# Patient Record
Sex: MALE | Race: ASIAN | NOT HISPANIC OR LATINO | URBAN - METROPOLITAN AREA
[De-identification: names, ages, dates, MRNs, and addresses within clinical notes are randomized per-mention and may not be internally consistent; named-entity substitution may affect disease eponyms.]

---

## 2023-01-14 ENCOUNTER — EMERGENCY (EMERGENCY)
Facility: HOSPITAL | Age: 48
LOS: 1 days | Discharge: HOME | End: 2023-01-14
Attending: EMERGENCY MEDICINE
Payer: COMMERCIAL

## 2023-01-14 VITALS
DIASTOLIC BLOOD PRESSURE: 99 MMHG | WEIGHT: 149.91 LBS | RESPIRATION RATE: 18 BRPM | HEART RATE: 92 BPM | HEIGHT: 67 IN | SYSTOLIC BLOOD PRESSURE: 166 MMHG | TEMPERATURE: 98 F | OXYGEN SATURATION: 98 %

## 2023-01-14 VITALS
RESPIRATION RATE: 18 BRPM | TEMPERATURE: 98 F | OXYGEN SATURATION: 100 % | HEART RATE: 74 BPM | SYSTOLIC BLOOD PRESSURE: 152 MMHG | DIASTOLIC BLOOD PRESSURE: 90 MMHG

## 2023-01-14 LAB
ALBUMIN SERPL ELPH-MCNC: 4.9 G/DL — SIGNIFICANT CHANGE UP (ref 3.3–5)
ALP SERPL-CCNC: 67 U/L — SIGNIFICANT CHANGE UP (ref 40–120)
ALT FLD-CCNC: 45 U/L — SIGNIFICANT CHANGE UP (ref 10–45)
ANION GAP SERPL CALC-SCNC: 11 MMOL/L — SIGNIFICANT CHANGE UP (ref 5–17)
APPEARANCE UR: CLEAR — SIGNIFICANT CHANGE UP
AST SERPL-CCNC: 20 U/L — SIGNIFICANT CHANGE UP (ref 10–40)
BASE EXCESS BLDV CALC-SCNC: 2.8 MMOL/L — SIGNIFICANT CHANGE UP (ref -2–3)
BASOPHILS # BLD AUTO: 0.05 K/UL — SIGNIFICANT CHANGE UP (ref 0–0.2)
BASOPHILS NFR BLD AUTO: 0.8 % — SIGNIFICANT CHANGE UP (ref 0–2)
BILIRUB SERPL-MCNC: 0.2 MG/DL — SIGNIFICANT CHANGE UP (ref 0.2–1.2)
BILIRUB UR-MCNC: NEGATIVE — SIGNIFICANT CHANGE UP
BUN SERPL-MCNC: 15 MG/DL — SIGNIFICANT CHANGE UP (ref 7–23)
CA-I SERPL-SCNC: 1.23 MMOL/L — SIGNIFICANT CHANGE UP (ref 1.15–1.33)
CALCIUM SERPL-MCNC: 9.7 MG/DL — SIGNIFICANT CHANGE UP (ref 8.4–10.5)
CHLORIDE BLDV-SCNC: 106 MMOL/L — SIGNIFICANT CHANGE UP (ref 96–108)
CHLORIDE SERPL-SCNC: 106 MMOL/L — SIGNIFICANT CHANGE UP (ref 96–108)
CO2 BLDV-SCNC: 32 MMOL/L — HIGH (ref 22–26)
CO2 SERPL-SCNC: 26 MMOL/L — SIGNIFICANT CHANGE UP (ref 22–31)
COLOR SPEC: SIGNIFICANT CHANGE UP
CREAT SERPL-MCNC: 0.96 MG/DL — SIGNIFICANT CHANGE UP (ref 0.5–1.3)
DIFF PNL FLD: NEGATIVE — SIGNIFICANT CHANGE UP
EGFR: 99 ML/MIN/1.73M2 — SIGNIFICANT CHANGE UP
EOSINOPHIL # BLD AUTO: 0.24 K/UL — SIGNIFICANT CHANGE UP (ref 0–0.5)
EOSINOPHIL NFR BLD AUTO: 3.9 % — SIGNIFICANT CHANGE UP (ref 0–6)
FLUAV AG NPH QL: SIGNIFICANT CHANGE UP
FLUBV AG NPH QL: SIGNIFICANT CHANGE UP
GAS PNL BLDV: 141 MMOL/L — SIGNIFICANT CHANGE UP (ref 136–145)
GAS PNL BLDV: SIGNIFICANT CHANGE UP
GLUCOSE BLDV-MCNC: 94 MG/DL — SIGNIFICANT CHANGE UP (ref 70–99)
GLUCOSE SERPL-MCNC: 94 MG/DL — SIGNIFICANT CHANGE UP (ref 70–99)
GLUCOSE UR QL: NEGATIVE — SIGNIFICANT CHANGE UP
HCO3 BLDV-SCNC: 30 MMOL/L — HIGH (ref 22–29)
HCT VFR BLD CALC: 47.7 % — SIGNIFICANT CHANGE UP (ref 39–50)
HCT VFR BLDA CALC: 46 % — SIGNIFICANT CHANGE UP (ref 39–51)
HGB BLD CALC-MCNC: 15.4 G/DL — SIGNIFICANT CHANGE UP (ref 12.6–17.4)
HGB BLD-MCNC: 15.4 G/DL — SIGNIFICANT CHANGE UP (ref 13–17)
IMM GRANULOCYTES NFR BLD AUTO: 0.2 % — SIGNIFICANT CHANGE UP (ref 0–0.9)
KETONES UR-MCNC: NEGATIVE — SIGNIFICANT CHANGE UP
LACTATE BLDV-MCNC: 1.4 MMOL/L — SIGNIFICANT CHANGE UP (ref 0.5–2)
LEUKOCYTE ESTERASE UR-ACNC: NEGATIVE — SIGNIFICANT CHANGE UP
LIDOCAIN IGE QN: 51 U/L — SIGNIFICANT CHANGE UP (ref 7–60)
LYMPHOCYTES # BLD AUTO: 2.14 K/UL — SIGNIFICANT CHANGE UP (ref 1–3.3)
LYMPHOCYTES # BLD AUTO: 35 % — SIGNIFICANT CHANGE UP (ref 13–44)
MCHC RBC-ENTMCNC: 25.7 PG — LOW (ref 27–34)
MCHC RBC-ENTMCNC: 32.3 GM/DL — SIGNIFICANT CHANGE UP (ref 32–36)
MCV RBC AUTO: 79.6 FL — LOW (ref 80–100)
MONOCYTES # BLD AUTO: 0.56 K/UL — SIGNIFICANT CHANGE UP (ref 0–0.9)
MONOCYTES NFR BLD AUTO: 9.2 % — SIGNIFICANT CHANGE UP (ref 2–14)
NEUTROPHILS # BLD AUTO: 3.11 K/UL — SIGNIFICANT CHANGE UP (ref 1.8–7.4)
NEUTROPHILS NFR BLD AUTO: 50.9 % — SIGNIFICANT CHANGE UP (ref 43–77)
NITRITE UR-MCNC: NEGATIVE — SIGNIFICANT CHANGE UP
NRBC # BLD: 0 /100 WBCS — SIGNIFICANT CHANGE UP (ref 0–0)
PCO2 BLDV: 56 MMHG — HIGH (ref 42–55)
PH BLDV: 7.34 — SIGNIFICANT CHANGE UP (ref 7.32–7.43)
PH UR: 7 — SIGNIFICANT CHANGE UP (ref 5–8)
PLATELET # BLD AUTO: 240 K/UL — SIGNIFICANT CHANGE UP (ref 150–400)
PO2 BLDV: 22 MMHG — LOW (ref 25–45)
POTASSIUM BLDV-SCNC: 4.4 MMOL/L — SIGNIFICANT CHANGE UP (ref 3.5–5.1)
POTASSIUM SERPL-MCNC: 4.4 MMOL/L — SIGNIFICANT CHANGE UP (ref 3.5–5.3)
POTASSIUM SERPL-SCNC: 4.4 MMOL/L — SIGNIFICANT CHANGE UP (ref 3.5–5.3)
PROT SERPL-MCNC: 7.8 G/DL — SIGNIFICANT CHANGE UP (ref 6–8.3)
PROT UR-MCNC: SIGNIFICANT CHANGE UP
RBC # BLD: 5.99 M/UL — HIGH (ref 4.2–5.8)
RBC # FLD: 12.3 % — SIGNIFICANT CHANGE UP (ref 10.3–14.5)
RSV RNA NPH QL NAA+NON-PROBE: SIGNIFICANT CHANGE UP
SAO2 % BLDV: 23.9 % — LOW (ref 67–88)
SARS-COV-2 RNA SPEC QL NAA+PROBE: SIGNIFICANT CHANGE UP
SODIUM SERPL-SCNC: 143 MMOL/L — SIGNIFICANT CHANGE UP (ref 135–145)
SP GR SPEC: >1.05 (ref 1.01–1.02)
TROPONIN T, HIGH SENSITIVITY RESULT: <6 NG/L — SIGNIFICANT CHANGE UP (ref 0–51)
UROBILINOGEN FLD QL: NEGATIVE — SIGNIFICANT CHANGE UP
WBC # BLD: 6.11 K/UL — SIGNIFICANT CHANGE UP (ref 3.8–10.5)
WBC # FLD AUTO: 6.11 K/UL — SIGNIFICANT CHANGE UP (ref 3.8–10.5)

## 2023-01-14 PROCEDURE — 84484 ASSAY OF TROPONIN QUANT: CPT

## 2023-01-14 PROCEDURE — 85018 HEMOGLOBIN: CPT

## 2023-01-14 PROCEDURE — 82330 ASSAY OF CALCIUM: CPT

## 2023-01-14 PROCEDURE — 81003 URINALYSIS AUTO W/O SCOPE: CPT

## 2023-01-14 PROCEDURE — 74177 CT ABD & PELVIS W/CONTRAST: CPT | Mod: MA

## 2023-01-14 PROCEDURE — 99284 EMERGENCY DEPT VISIT MOD MDM: CPT

## 2023-01-14 PROCEDURE — 84295 ASSAY OF SERUM SODIUM: CPT

## 2023-01-14 PROCEDURE — 82947 ASSAY GLUCOSE BLOOD QUANT: CPT

## 2023-01-14 PROCEDURE — 85025 COMPLETE CBC W/AUTO DIFF WBC: CPT

## 2023-01-14 PROCEDURE — 85014 HEMATOCRIT: CPT

## 2023-01-14 PROCEDURE — 83690 ASSAY OF LIPASE: CPT

## 2023-01-14 PROCEDURE — 82435 ASSAY OF BLOOD CHLORIDE: CPT

## 2023-01-14 PROCEDURE — 83605 ASSAY OF LACTIC ACID: CPT

## 2023-01-14 PROCEDURE — 80053 COMPREHEN METABOLIC PANEL: CPT

## 2023-01-14 PROCEDURE — 99285 EMERGENCY DEPT VISIT HI MDM: CPT | Mod: 25

## 2023-01-14 PROCEDURE — 74177 CT ABD & PELVIS W/CONTRAST: CPT | Mod: 26,MA

## 2023-01-14 PROCEDURE — 71046 X-RAY EXAM CHEST 2 VIEWS: CPT

## 2023-01-14 PROCEDURE — 84132 ASSAY OF SERUM POTASSIUM: CPT

## 2023-01-14 PROCEDURE — 36415 COLL VENOUS BLD VENIPUNCTURE: CPT

## 2023-01-14 PROCEDURE — 87086 URINE CULTURE/COLONY COUNT: CPT

## 2023-01-14 PROCEDURE — 93005 ELECTROCARDIOGRAM TRACING: CPT

## 2023-01-14 PROCEDURE — 87637 SARSCOV2&INF A&B&RSV AMP PRB: CPT

## 2023-01-14 PROCEDURE — 71046 X-RAY EXAM CHEST 2 VIEWS: CPT | Mod: 26

## 2023-01-14 PROCEDURE — 82803 BLOOD GASES ANY COMBINATION: CPT

## 2023-01-14 NOTE — ED PROVIDER NOTE - NSFOLLOWUPCLINICS_GEN_ALL_ED_FT
Gastroenterology at [facility]  Gastroenterology  [address] [place] [id number]  Phone: [phone]  Fax:

## 2023-01-14 NOTE — ED PROVIDER NOTE - OBJECTIVE STATEMENT
47M no PMH p/w abdominal pain and distention. Wife at bedside. Pt says the pain is a stabbing pain located throughout his abdomen, is intermittent, started 4 months ago, does not radiate. Today the pain lasted for 1 hour and was more severe. Says that sometimes the pain occurs after eating. Nothing relieves the pain. Pt says that when he has the pain, his abdomen gets distended. Denies fever, N/V, chest pain, SOB, diarrhea, constipation, urinary symptoms. Is passing gas. No hx of abdominal surgeries.

## 2023-01-14 NOTE — ED PROVIDER NOTE - PROGRESS NOTE DETAILS
Maame Pal M.D. (Resident Physician) Maame Pal M.D. (Resident Physician): Able to tolerate PO. Will dc with GI f/u.

## 2023-01-14 NOTE — ED PROVIDER NOTE - ATTENDING CONTRIBUTION TO CARE
Attending MD Damaris Chan:  I personally have seen and examined this patient.  Resident note reviewed and agree on plan of care and except where noted.  See HPI, PE, and MDM for details.

## 2023-01-14 NOTE — ED PROVIDER NOTE - PHYSICAL EXAMINATION
PHYSICAL EXAM:  GENERAL: Sitting comfortable in bed, in no acute distress  HENMT: Atraumatic, moist mucous membranes, no oropharyngeal exudates or vesicles, uvula is midline EYES: Clear bilaterally, PERRL, EOMs intact b/l  HEART: RRR, S1/S2, no murmur  RESPIRATORY: Clear to auscultation bilaterally, no wheezes/rhonchi/rales  ABDOMEN: +BS, soft, mild epigastric ttp, nondistended  EXTREMITIES: No lower extremity edema, +2 radial and pt pulses b/l  NEURO: A&Ox3  SKIN: Skin normal color for race, warm, dry and intact PHYSICAL EXAM:  GENERAL: Sitting comfortable in bed, in no acute distress  HENMT: Atraumatic, moist mucous membranes, no oropharyngeal exudates or vesicles, uvula is midline EYES: Clear bilaterally, PERRL, EOMs intact b/l  HEART: RRR, S1/S2, no murmur  RESPIRATORY: Clear to auscultation bilaterally, no wheezes/rhonchi/rales  ABDOMEN: +BS, soft, mild epigastric ttp, nondistended  EXTREMITIES: No lower extremity edema, +2 radial and pt pulses b/l  NEURO: A&Ox3  SKIN: Skin normal color for race, warm, dry and intact  Attending Damaris Chan: Gen: NAD, heent: atrauamtic, eomi, perrla,sclera anicteric mmm, op pink, , neck; nttp, no nuchal rigidity, chest: nttp, no crepitus, cv: rrr, no murmurs, lungs: ctab, abd: soft, nontender, nondistended, no peritoneal signs, no guarding, ext: wwp, neg homans, skin: no rash, neuro: awake and alert, following commands, speech clear, sensation and strength intact, no focal deficits

## 2023-01-14 NOTE — ED PROVIDER NOTE - RAPID ASSESSMENT
Dr. Toro ED Attending- abd pain and distention for 4 months, no evaluation for this prior, denies smoking, social etoh, denies cp, sob, n/v/d/c, dysuria, recent travel, dark stools, brbpr, abdominal surgeries, weight loss, night sweats  reports eating makes the pain worse  denies pmhx, pshx, NKDA    Patient was rapidly assessed via telemedicine encounter; a limited history and physical exam was performed. The patient will be seen and further worked up in the main emergency department and their care will be completed by the main emergency department team. Receiving team will follow up on labs, analgesia, any clinical imaging, and perform reassessment and disposition of the patient as clinically indicated.  All decisions regarding the progression of care will be made at their discretion.

## 2023-01-14 NOTE — ED PROVIDER NOTE - CLINICAL SUMMARY MEDICAL DECISION MAKING FREE TEXT BOX
47M no PMH p/w abdominal pain and distention. Vitals: /99, others unremarkable. On exam, abdomen with +BS, soft, mild epigastric ttp, nondistended. Possible acid reflux. Will r/o SBO. Plan: blood work, UA/UC, Ct abd/pel. Will re-assess. 47M no PMH p/w abdominal pain and distention. Vitals: /99, others unremarkable. On exam, abdomen with +BS, soft, mild epigastric ttp, nondistended. Possible acid reflux. Will r/o SBO. Plan: blood work, UA/UC, Ct abd/pel. Will re-assess.  Attending Damaris Chan: 46 yo male presenting with abdominal pain. upon arrival pt hemodynamically stable. on exam abd soft. no RUQ ttp to suggest acute cholecysittis. pt passing gas and having BM making obstruction less likely. no RLQ ttp to suggest acute appendictis. ct scan ordered to further evaluate without evidence of acute surgical patholgy. no dysuria or testicle pain to suggest torsion or uti. pt well appearing. tolerating po in the ed. will give GI follow up no chest pain or sob to suggest ACS

## 2023-01-14 NOTE — ED ADULT NURSE NOTE - OBJECTIVE STATEMENT
48yo M denies pmh presents to ED from home with wife at bedside with c/o abdominal pain and distention x4 months. pt reports the pain comes and goes intermittently and is a stabbing pain felt diffusely throughout the abdomen, does not radiate. reports abdomen is distended associated with the pain and has been progressively worsening. has not been seen by Dr. yeung for symptoms but presents to ED today because he had approx 1 hour episode of very severe pain which has since resolved on its own. Denies any pain currently on exam in ED. pt reports he is able to tolerate po intake, bowel movements have been normal, and has been passing gas. pt denies any associated fevers, nausea, vomiting, diarrhea, constipation, urinary changes, or hx of abdominal surgeries. on exam pt is awake and alert, oriented x4, spontaneous unlabored respirations, afebrile, VSS, abd soft nt nd. pt seen and eval by ED MD. IV placed, labs drawn and sent. CT scan completed. pending results. plan of care discussed with pt and wife at bedside.

## 2023-01-14 NOTE — ED PROVIDER NOTE - NSFOLLOWUPINSTRUCTIONS_ED_ALL_ED_FT
You have been evaluated in the Emergency Department today for abdominal pain. Your evaluation did not show evidence of medical conditions requiring emergent intervention at this time.    Please call [phone] to schedule an appointment with the gastroenterologist.     Avoid eating spicy foods and taking any NSAIDs.    Return to the Emergency Department if you experience worsening or uncontrolled pain, fevers 100.4°F or greater, recurrent vomiting, inability to tolerate food or fluids by mouth, bloody stools or vomit, black or tarry stools, or any other concerning symptoms.    Thank you for choosing us for your care.

## 2023-01-14 NOTE — ED PROVIDER NOTE - PATIENT PORTAL LINK FT
You can access the FollowMyHealth Patient Portal offered by [facility] by registering at the following website: [email or web address]. By joining [facility]’s FollowMyHealth portal, you will also be able to view your health information using other applications (apps) compatible with our system.

## 2023-01-16 LAB
CULTURE RESULTS: NO GROWTH — SIGNIFICANT CHANGE UP
SPECIMEN SOURCE: SIGNIFICANT CHANGE UP

## 2024-10-26 ENCOUNTER — INPATIENT (INPATIENT)
Facility: HOSPITAL | Age: 49
LOS: 5 days | Discharge: HOME HEALTH | DRG: 552 | End: 2024-11-01
Attending: INTERNAL MEDICINE | Admitting: INTERNAL MEDICINE
Payer: COMMERCIAL

## 2024-10-26 VITALS
SYSTOLIC BLOOD PRESSURE: 166 MMHG | DIASTOLIC BLOOD PRESSURE: 98 MMHG | OXYGEN SATURATION: 100 % | HEIGHT: 67 IN | HEART RATE: 92 BPM | RESPIRATION RATE: 18 BRPM | WEIGHT: 147.93 LBS | TEMPERATURE: 99 F

## 2024-10-26 DIAGNOSIS — M54.42 LUMBAGO WITH SCIATICA, LEFT SIDE: ICD-10-CM

## 2024-10-26 PROBLEM — Z78.9 OTHER SPECIFIED HEALTH STATUS: Chronic | Status: ACTIVE | Noted: 2023-01-14

## 2024-10-26 LAB
ALBUMIN SERPL ELPH-MCNC: 4.6 G/DL — SIGNIFICANT CHANGE UP (ref 3.3–5)
ALP SERPL-CCNC: 65 U/L — SIGNIFICANT CHANGE UP (ref 40–120)
ALT FLD-CCNC: 40 U/L — SIGNIFICANT CHANGE UP (ref 10–45)
ANION GAP SERPL CALC-SCNC: 13 MMOL/L — SIGNIFICANT CHANGE UP (ref 5–17)
APPEARANCE UR: CLEAR — SIGNIFICANT CHANGE UP
AST SERPL-CCNC: 21 U/L — SIGNIFICANT CHANGE UP (ref 10–40)
BACTERIA # UR AUTO: NEGATIVE /HPF — SIGNIFICANT CHANGE UP
BASOPHILS # BLD AUTO: 0.05 K/UL — SIGNIFICANT CHANGE UP (ref 0–0.2)
BASOPHILS NFR BLD AUTO: 0.6 % — SIGNIFICANT CHANGE UP (ref 0–2)
BILIRUB SERPL-MCNC: 0.4 MG/DL — SIGNIFICANT CHANGE UP (ref 0.2–1.2)
BILIRUB UR-MCNC: NEGATIVE — SIGNIFICANT CHANGE UP
BUN SERPL-MCNC: 14 MG/DL — SIGNIFICANT CHANGE UP (ref 7–23)
CALCIUM SERPL-MCNC: 9.5 MG/DL — SIGNIFICANT CHANGE UP (ref 8.4–10.5)
CAST: 0 /LPF — SIGNIFICANT CHANGE UP (ref 0–4)
CHLORIDE SERPL-SCNC: 103 MMOL/L — SIGNIFICANT CHANGE UP (ref 96–108)
CO2 SERPL-SCNC: 22 MMOL/L — SIGNIFICANT CHANGE UP (ref 22–31)
COLOR SPEC: YELLOW — SIGNIFICANT CHANGE UP
CREAT SERPL-MCNC: 1.13 MG/DL — SIGNIFICANT CHANGE UP (ref 0.5–1.3)
DIFF PNL FLD: NEGATIVE — SIGNIFICANT CHANGE UP
EGFR: 81 ML/MIN/1.73M2 — SIGNIFICANT CHANGE UP
EOSINOPHIL # BLD AUTO: 0.2 K/UL — SIGNIFICANT CHANGE UP (ref 0–0.5)
EOSINOPHIL NFR BLD AUTO: 2.3 % — SIGNIFICANT CHANGE UP (ref 0–6)
GLUCOSE SERPL-MCNC: 96 MG/DL — SIGNIFICANT CHANGE UP (ref 70–99)
GLUCOSE UR QL: NEGATIVE MG/DL — SIGNIFICANT CHANGE UP
HCT VFR BLD CALC: 42.7 % — SIGNIFICANT CHANGE UP (ref 39–50)
HGB BLD-MCNC: 13.6 G/DL — SIGNIFICANT CHANGE UP (ref 13–17)
IMM GRANULOCYTES NFR BLD AUTO: 0.5 % — SIGNIFICANT CHANGE UP (ref 0–0.9)
KETONES UR-MCNC: NEGATIVE MG/DL — SIGNIFICANT CHANGE UP
LEUKOCYTE ESTERASE UR-ACNC: NEGATIVE — SIGNIFICANT CHANGE UP
LYMPHOCYTES # BLD AUTO: 1.52 K/UL — SIGNIFICANT CHANGE UP (ref 1–3.3)
LYMPHOCYTES # BLD AUTO: 17.6 % — SIGNIFICANT CHANGE UP (ref 13–44)
MCHC RBC-ENTMCNC: 25.3 PG — LOW (ref 27–34)
MCHC RBC-ENTMCNC: 31.9 GM/DL — LOW (ref 32–36)
MCV RBC AUTO: 79.5 FL — LOW (ref 80–100)
MONOCYTES # BLD AUTO: 0.79 K/UL — SIGNIFICANT CHANGE UP (ref 0–0.9)
MONOCYTES NFR BLD AUTO: 9.2 % — SIGNIFICANT CHANGE UP (ref 2–14)
NEUTROPHILS # BLD AUTO: 6.03 K/UL — SIGNIFICANT CHANGE UP (ref 1.8–7.4)
NEUTROPHILS NFR BLD AUTO: 69.8 % — SIGNIFICANT CHANGE UP (ref 43–77)
NITRITE UR-MCNC: NEGATIVE — SIGNIFICANT CHANGE UP
NRBC # BLD: 0 /100 WBCS — SIGNIFICANT CHANGE UP (ref 0–0)
PH UR: 7 — SIGNIFICANT CHANGE UP (ref 5–8)
PLATELET # BLD AUTO: 244 K/UL — SIGNIFICANT CHANGE UP (ref 150–400)
POTASSIUM SERPL-MCNC: 4.3 MMOL/L — SIGNIFICANT CHANGE UP (ref 3.5–5.3)
POTASSIUM SERPL-SCNC: 4.3 MMOL/L — SIGNIFICANT CHANGE UP (ref 3.5–5.3)
PROT SERPL-MCNC: 7.4 G/DL — SIGNIFICANT CHANGE UP (ref 6–8.3)
PROT UR-MCNC: NEGATIVE MG/DL — SIGNIFICANT CHANGE UP
RBC # BLD: 5.37 M/UL — SIGNIFICANT CHANGE UP (ref 4.2–5.8)
RBC # FLD: 12.6 % — SIGNIFICANT CHANGE UP (ref 10.3–14.5)
RBC CASTS # UR COMP ASSIST: 0 /HPF — SIGNIFICANT CHANGE UP (ref 0–4)
SODIUM SERPL-SCNC: 138 MMOL/L — SIGNIFICANT CHANGE UP (ref 135–145)
SP GR SPEC: 1.01 — SIGNIFICANT CHANGE UP (ref 1–1.03)
SQUAMOUS # UR AUTO: 0 /HPF — SIGNIFICANT CHANGE UP (ref 0–5)
UROBILINOGEN FLD QL: 0.2 MG/DL — SIGNIFICANT CHANGE UP (ref 0.2–1)
WBC # BLD: 8.63 K/UL — SIGNIFICANT CHANGE UP (ref 3.8–10.5)
WBC # FLD AUTO: 8.63 K/UL — SIGNIFICANT CHANGE UP (ref 3.8–10.5)
WBC UR QL: 0 /HPF — SIGNIFICANT CHANGE UP (ref 0–5)

## 2024-10-26 PROCEDURE — 99285 EMERGENCY DEPT VISIT HI MDM: CPT

## 2024-10-26 PROCEDURE — 72131 CT LUMBAR SPINE W/O DYE: CPT | Mod: 26

## 2024-10-26 PROCEDURE — 72100 X-RAY EXAM L-S SPINE 2/3 VWS: CPT | Mod: 26

## 2024-10-26 RX ORDER — HEPARIN SODIUM 10000 [USP'U]/ML
5000 INJECTION INTRAVENOUS; SUBCUTANEOUS EVERY 8 HOURS
Refills: 0 | Status: DISCONTINUED | OUTPATIENT
Start: 2024-10-26 | End: 2024-11-01

## 2024-10-26 RX ORDER — SODIUM CHLORIDE 9 MG/ML
1000 INJECTION, SOLUTION INTRAMUSCULAR; INTRAVENOUS; SUBCUTANEOUS ONCE
Refills: 0 | Status: COMPLETED | OUTPATIENT
Start: 2024-10-26 | End: 2024-10-26

## 2024-10-26 RX ORDER — LIDOCAINE HYDROCHLORIDE 40 MG/ML
1 SOLUTION TOPICAL
Refills: 0 | DISCHARGE

## 2024-10-26 RX ORDER — CYANOCOBALAMIN (VITAMIN B-12) 1000MCG/15
1000 LIQUID (ML) ORAL DAILY
Refills: 0 | Status: DISCONTINUED | OUTPATIENT
Start: 2024-10-26 | End: 2024-11-01

## 2024-10-26 RX ORDER — LISINOPRIL 40 MG
5 TABLET ORAL ONCE
Refills: 0 | Status: COMPLETED | OUTPATIENT
Start: 2024-10-26 | End: 2024-10-26

## 2024-10-26 RX ORDER — HYDROMORPHONE HCL/0.9% NACL/PF 6 MG/30 ML
1 PATIENT CONTROLLED ANALGESIA SYRINGE INTRAVENOUS ONCE
Refills: 0 | Status: DISCONTINUED | OUTPATIENT
Start: 2024-10-26 | End: 2024-10-26

## 2024-10-26 RX ORDER — LISINOPRIL 40 MG
5 TABLET ORAL DAILY
Refills: 0 | Status: DISCONTINUED | OUTPATIENT
Start: 2024-10-26 | End: 2024-10-28

## 2024-10-26 RX ORDER — ACETAMINOPHEN 500 MG
1000 TABLET ORAL ONCE
Refills: 0 | Status: COMPLETED | OUTPATIENT
Start: 2024-10-26 | End: 2024-10-26

## 2024-10-26 RX ORDER — MORPHINE SULFATE 30 MG/1
4 TABLET, EXTENDED RELEASE ORAL ONCE
Refills: 0 | Status: DISCONTINUED | OUTPATIENT
Start: 2024-10-26 | End: 2024-10-26

## 2024-10-26 RX ORDER — MORPHINE SULFATE 30 MG/1
2 TABLET, EXTENDED RELEASE ORAL EVERY 6 HOURS
Refills: 0 | Status: DISCONTINUED | OUTPATIENT
Start: 2024-10-26 | End: 2024-10-28

## 2024-10-26 RX ORDER — NAPROXEN 250 MG/1
500 TABLET ORAL
Refills: 0 | Status: DISCONTINUED | OUTPATIENT
Start: 2024-10-26 | End: 2024-10-26

## 2024-10-26 RX ORDER — CHOLECALCIFEROL (VITAMIN D3) 625 MCG
1 CAPSULE ORAL
Refills: 0 | DISCHARGE

## 2024-10-26 RX ORDER — OXYCODONE HYDROCHLORIDE 30 MG/1
5 TABLET ORAL
Refills: 0 | Status: DISCONTINUED | OUTPATIENT
Start: 2024-10-26 | End: 2024-10-26

## 2024-10-26 RX ORDER — DIAZEPAM 10 MG/1
5 FILM BUCCAL ONCE
Refills: 0 | Status: DISCONTINUED | OUTPATIENT
Start: 2024-10-26 | End: 2024-10-26

## 2024-10-26 RX ORDER — LIDOCAINE HYDROCHLORIDE 40 MG/ML
1 SOLUTION TOPICAL DAILY
Refills: 0 | Status: DISCONTINUED | OUTPATIENT
Start: 2024-10-26 | End: 2024-10-26

## 2024-10-26 RX ORDER — PREGABALIN 150 MG/1
75 CAPSULE ORAL
Refills: 0 | Status: DISCONTINUED | OUTPATIENT
Start: 2024-10-26 | End: 2024-10-27

## 2024-10-26 RX ORDER — CYANOCOBALAMIN (VITAMIN B-12) 1000MCG/15
1 LIQUID (ML) ORAL
Refills: 0 | DISCHARGE

## 2024-10-26 RX ORDER — LIDOCAINE HYDROCHLORIDE 40 MG/ML
1 SOLUTION TOPICAL EVERY 24 HOURS
Refills: 0 | Status: DISCONTINUED | OUTPATIENT
Start: 2024-10-26 | End: 2024-11-01

## 2024-10-26 RX ORDER — METHOCARBAMOL 500 MG/1
750 TABLET ORAL
Refills: 0 | Status: DISCONTINUED | OUTPATIENT
Start: 2024-10-26 | End: 2024-10-29

## 2024-10-26 RX ORDER — KETOROLAC TROMETHAMINE 30 MG/ML
15 INJECTION INTRAMUSCULAR; INTRAVENOUS EVERY 8 HOURS
Refills: 0 | Status: DISCONTINUED | OUTPATIENT
Start: 2024-10-26 | End: 2024-10-27

## 2024-10-26 RX ADMIN — HEPARIN SODIUM 5000 UNIT(S): 10000 INJECTION INTRAVENOUS; SUBCUTANEOUS at 21:15

## 2024-10-26 RX ADMIN — KETOROLAC TROMETHAMINE 15 MILLIGRAM(S): 30 INJECTION INTRAMUSCULAR; INTRAVENOUS at 23:14

## 2024-10-26 RX ADMIN — Medication 1 MILLIGRAM(S): at 19:58

## 2024-10-26 RX ADMIN — NAPROXEN 500 MILLIGRAM(S): 250 TABLET ORAL at 21:15

## 2024-10-26 RX ADMIN — MORPHINE SULFATE 4 MILLIGRAM(S): 30 TABLET, EXTENDED RELEASE ORAL at 14:10

## 2024-10-26 RX ADMIN — MORPHINE SULFATE 4 MILLIGRAM(S): 30 TABLET, EXTENDED RELEASE ORAL at 13:29

## 2024-10-26 RX ADMIN — DIAZEPAM 5 MILLIGRAM(S): 10 FILM BUCCAL at 15:45

## 2024-10-26 RX ADMIN — SODIUM CHLORIDE 1000 MILLILITER(S): 9 INJECTION, SOLUTION INTRAMUSCULAR; INTRAVENOUS; SUBCUTANEOUS at 13:29

## 2024-10-26 RX ADMIN — Medication 1000 MILLIGRAM(S): at 14:10

## 2024-10-26 RX ADMIN — METHOCARBAMOL 750 MILLIGRAM(S): 500 TABLET ORAL at 21:15

## 2024-10-26 RX ADMIN — LIDOCAINE HYDROCHLORIDE 1 PATCH: 40 SOLUTION TOPICAL at 21:44

## 2024-10-26 RX ADMIN — Medication 400 MILLIGRAM(S): at 13:29

## 2024-10-26 RX ADMIN — PREGABALIN 75 MILLIGRAM(S): 150 CAPSULE ORAL at 21:15

## 2024-10-26 RX ADMIN — NAPROXEN 500 MILLIGRAM(S): 250 TABLET ORAL at 21:45

## 2024-10-26 RX ADMIN — Medication 5 MILLIGRAM(S): at 21:14

## 2024-10-26 NOTE — ED PROVIDER NOTE - CARE PLAN
1 Principal Discharge DX:	Low back pain with left-sided sciatica   Principal Discharge DX:	Low back pain with left-sided sciatica  Secondary Diagnosis:	Difficulty walking

## 2024-10-26 NOTE — ED PROVIDER NOTE - OBJECTIVE STATEMENT
48yo male with PMHx of Bulging L4-5 s/p Epidural one month ago by Dr. Reed presents to ED with recurrent worsening left low back pain after bending over yesterday. Reports he's had low back pain with radiating pain to LLE since 1.5yesra ago and f/ued with pain management Dr. Reed. He had PT and Robaxin, Lyrica and Meloxicam without pain relief. He had Epidural one month ago and noticed significant improvement. He felt recurrent sharp pain to low back with radiating LLE pain after bending over yesterday. He tool Oxycodone, Robaxin, and Naproxen without relief and he couldn't walk this morning. He also c/o LLE weakness today. Denies urinary or bowel problems. Denies sensory changes to genital area and extremities. Denies CP/SOB/ABD pain or N/V/D.

## 2024-10-26 NOTE — ED PROVIDER NOTE - PHYSICAL EXAMINATION
NAD. Hypertensive. Afebrile. Lungs clear. Neck supple. No spinal midline tender. +Generalized left para lumbar tender. No CVA tender. ABD soft, non tender. Intact sensory to all extremities. Bilateral UE and RLE with 5/5strength and LLE with 4/5 strength.

## 2024-10-26 NOTE — ED ADULT NURSE NOTE - OBJECTIVE STATEMENT
Pt is a 48 y/o male with PMH herniated discs presenting to the ED c/o back pain starting acutely after bending down yesterday, pt reports pain to L back radiating down his leg. Pt reports difficulty walking and moving due to pain. Pt reports trying pharmacologic measures for pain prior to arrival without relief. Pt denies current numbness/tingling, chest pain, sob, weakness, urinary/bowel incontinence, falls or additional injuries.

## 2024-10-26 NOTE — CHART NOTE - NSCHARTNOTEFT_GEN_A_CORE
Notified by RN, patient arrived to floor, 7T hypertensive with systolic 160s. Patient complains of 10/10 back pain, same pain he initially presented with. Discussed case with Dr. Marin, plan as follows: order lisinopril 5mg QD for blood pressure, and pain control (toradol 15mg Q8 PRN for moderate pain and morphine 2mg Q6 PRN for severe pain), also ordered lidocaine patch. Discussed plan with floor RN, Sobeida, will follow up vital signs s/p pain control. CT of back performed, pending results. Sign out given to floor provider to follow up offical H&P.     Vital Signs Last 24 Hrs  T(C): 37 (26 Oct 2024 20:32), Max: 37.3 (26 Oct 2024 19:55)  T(F): 98.6 (26 Oct 2024 20:32), Max: 99.2 (26 Oct 2024 19:55)  HR: 77 (26 Oct 2024 20:32) (77 - 92)  BP: 192/100 (26 Oct 2024 21:05) (166/98 - 196/29)  BP(mean): --  RR: 18 (26 Oct 2024 20:32) (18 - 20)  SpO2: 99% (26 Oct 2024 20:32) (98% - 100%)    Parameters below as of 26 Oct 2024 20:32  Patient On (Oxygen Delivery Method): room air    Leigh Ann Amaya PA-C  Internal Medicine Notified by RN, patient arrived to floor, 7T hypertensive with systolic 160s. Patient complains of 10/10 back pain, same pain he initially presented with. Discussed case with Dr. Marin, plan as follows: order lisinopril 5mg QD for blood pressure, and pain control (toradol 15mg Q8 PRN for moderate pain and morphine 2mg Q6 PRN for severe pain), also ordered lidocaine patch. Discussed plan with floor RN, Sobeida, will follow up vital signs s/p pain control. CT of back performed, pending results. Sign out given to floor provider to follow up official H&P.     Vital Signs Last 24 Hrs  T(C): 37 (26 Oct 2024 20:32), Max: 37.3 (26 Oct 2024 19:55)  T(F): 98.6 (26 Oct 2024 20:32), Max: 99.2 (26 Oct 2024 19:55)  HR: 77 (26 Oct 2024 20:32) (77 - 92)  BP: 192/100 (26 Oct 2024 21:05) (166/98 - 196/29)  BP(mean): --  RR: 18 (26 Oct 2024 20:32) (18 - 20)  SpO2: 99% (26 Oct 2024 20:32) (98% - 100%)    Parameters below as of 26 Oct 2024 20:32  Patient On (Oxygen Delivery Method): room air    Leigh Ann Amaya PA-C  Internal Medicine    ADDENDUM 10/26/2024 22:01    Per Dr. Marin d/c patients home naproxen, ordered IV toradol 15mg Q6 PRN for moderate pain.

## 2024-10-26 NOTE — ED PROVIDER NOTE - CLINICAL SUMMARY MEDICAL DECISION MAKING FREE TEXT BOX
49M hxof L4/5 disc bulge sp epidural ~1 month ago w alleviation of backpain sx coming in today due to increased low back pain w radiation to LLE and some weakness after bending down yesterday. No fall to ground or direct trauma to back. C/o pain to low lumbar region radiating to L glute and down back of L leg, worse w mvmt, difficulty ambulating 2/2 pain, Taking mult meds at home for pain and sx relief w/o avail. No fever. No saddle anesthesia. No bowel or bladder dysf. No midline TTP TL spine, BL lumbra paraspinal TTP. L gluteal TTP. No sensory changes-sensation intact throughout. Motor 4/5 to LLE in hip flexion, knee flex/ext, platarflexion and dorsiflexion, and EHL. Low suspicion for fracture or spinal epidural abscess. Suspect sx are due to worsening lumbar disc herniation. Will get screening XR, possibly CT though suspect of low utility, d/w spine. TBA given weakness and cant walk. No signs of central spinal cord compression.

## 2024-10-26 NOTE — ED ADULT NURSE NOTE - NS ED NURSE LEVEL OF CONSCIOUSNESS MENTAL STATUS
Patient settled in room 3006 in stable condition. Report received from Mihaela FERNANDES. Bands verified with mom and dad. Pediatrician Macrina Morales MD verified with mom and dad.   Awake/Alert

## 2024-10-26 NOTE — ED ADULT NURSE NOTE - NSFALLRISKINTERV_ED_ALL_ED
Assistance OOB with selected safe patient handling equipment if applicable/Assistance with ambulation/Communicate fall risk and risk factors to all staff, patient, and family/Monitor gait and stability/Provide visual cue: yellow wristband, yellow gown, etc/Reinforce activity limits and safety measures with patient and family/Call bell, personal items and telephone in reach/Instruct patient to call for assistance before getting out of bed/chair/stretcher/Non-slip footwear applied when patient is off stretcher/[place] to call system/Physically safe environment - no spills, clutter or unnecessary equipment/Purposeful Proactive Rounding/Room/bathroom lighting operational, light cord in reach

## 2024-10-26 NOTE — H&P ADULT - ASSESSMENT
Acute  Intractable Back pain   Neuro sx consulted   MRI   Pain meds prn     PT     Elevated Blood Pressure     Start patient  on Lisinopril

## 2024-10-26 NOTE — ED PROVIDER NOTE - PROGRESS NOTE DETAILS
NAD. Pt went to x-ray. Pt states no improvement after meds. Will give Valium and reassess the pain later. Pt's not able to stand and walk due to severe pain and will adm for pain control, spine consult and PT/OT. Paged neurosurgery for spine consult and PMD for adm. Spoke to PMD Dr. Simon Bishop and upded pt's status. Dr. Bishop recommended Dr. Carrera service for adm and spine Dr. Cruz consult. Paged Dr. Marin. Spoke to Dr. Marin and was recommended spine neurosurgery consult in ED because Dr. Cruz's not on call today. Paged neurosurgery again. Spoke to neurosurgery and was recommended L spine CT.

## 2024-10-27 PROCEDURE — 72148 MRI LUMBAR SPINE W/O DYE: CPT | Mod: 26

## 2024-10-27 RX ORDER — HYDROMORPHONE HCL/0.9% NACL/PF 6 MG/30 ML
1 PATIENT CONTROLLED ANALGESIA SYRINGE INTRAVENOUS
Refills: 0 | Status: DISCONTINUED | OUTPATIENT
Start: 2024-10-27 | End: 2024-10-29

## 2024-10-27 RX ORDER — SENNA 187 MG
2 TABLET ORAL AT BEDTIME
Refills: 0 | Status: DISCONTINUED | OUTPATIENT
Start: 2024-10-27 | End: 2024-11-01

## 2024-10-27 RX ORDER — HYDROMORPHONE HCL/0.9% NACL/PF 6 MG/30 ML
2 PATIENT CONTROLLED ANALGESIA SYRINGE INTRAVENOUS EVERY 6 HOURS
Refills: 0 | Status: DISCONTINUED | OUTPATIENT
Start: 2024-10-27 | End: 2024-10-29

## 2024-10-27 RX ORDER — ACETAMINOPHEN 500 MG
975 TABLET ORAL EVERY 6 HOURS
Refills: 0 | Status: DISCONTINUED | OUTPATIENT
Start: 2024-10-27 | End: 2024-11-01

## 2024-10-27 RX ORDER — KETOROLAC TROMETHAMINE 30 MG/ML
30 INJECTION INTRAMUSCULAR; INTRAVENOUS EVERY 8 HOURS
Refills: 0 | Status: DISCONTINUED | OUTPATIENT
Start: 2024-10-27 | End: 2024-10-29

## 2024-10-27 RX ORDER — PREGABALIN 150 MG/1
150 CAPSULE ORAL
Refills: 0 | Status: DISCONTINUED | OUTPATIENT
Start: 2024-10-27 | End: 2024-11-01

## 2024-10-27 RX ORDER — POLYETHYLENE GLYCOL 3350 17 G/17G
17 POWDER, FOR SOLUTION ORAL DAILY
Refills: 0 | Status: DISCONTINUED | OUTPATIENT
Start: 2024-10-27 | End: 2024-10-30

## 2024-10-27 RX ORDER — HYDROMORPHONE HCL/0.9% NACL/PF 6 MG/30 ML
0.5 PATIENT CONTROLLED ANALGESIA SYRINGE INTRAVENOUS EVERY 6 HOURS
Refills: 0 | Status: DISCONTINUED | OUTPATIENT
Start: 2024-10-27 | End: 2024-10-27

## 2024-10-27 RX ADMIN — KETOROLAC TROMETHAMINE 15 MILLIGRAM(S): 30 INJECTION INTRAMUSCULAR; INTRAVENOUS at 06:22

## 2024-10-27 RX ADMIN — Medication 5 MILLIGRAM(S): at 05:14

## 2024-10-27 RX ADMIN — PREGABALIN 75 MILLIGRAM(S): 150 CAPSULE ORAL at 05:15

## 2024-10-27 RX ADMIN — KETOROLAC TROMETHAMINE 30 MILLIGRAM(S): 30 INJECTION INTRAMUSCULAR; INTRAVENOUS at 15:47

## 2024-10-27 RX ADMIN — Medication 1000 MICROGRAM(S): at 11:49

## 2024-10-27 RX ADMIN — Medication 2 MILLIGRAM(S): at 21:03

## 2024-10-27 RX ADMIN — MORPHINE SULFATE 2 MILLIGRAM(S): 30 TABLET, EXTENDED RELEASE ORAL at 08:47

## 2024-10-27 RX ADMIN — KETOROLAC TROMETHAMINE 15 MILLIGRAM(S): 30 INJECTION INTRAMUSCULAR; INTRAVENOUS at 07:00

## 2024-10-27 RX ADMIN — KETOROLAC TROMETHAMINE 15 MILLIGRAM(S): 30 INJECTION INTRAMUSCULAR; INTRAVENOUS at 00:00

## 2024-10-27 RX ADMIN — METHOCARBAMOL 750 MILLIGRAM(S): 500 TABLET ORAL at 17:28

## 2024-10-27 RX ADMIN — METHOCARBAMOL 750 MILLIGRAM(S): 500 TABLET ORAL at 05:14

## 2024-10-27 RX ADMIN — Medication 0.5 MILLIGRAM(S): at 14:54

## 2024-10-27 RX ADMIN — Medication 2 TABLET(S): at 22:22

## 2024-10-27 RX ADMIN — Medication 0.5 MILLIGRAM(S): at 13:54

## 2024-10-27 RX ADMIN — Medication 975 MILLIGRAM(S): at 23:21

## 2024-10-27 RX ADMIN — PREGABALIN 150 MILLIGRAM(S): 150 CAPSULE ORAL at 17:28

## 2024-10-27 RX ADMIN — HEPARIN SODIUM 5000 UNIT(S): 10000 INJECTION INTRAVENOUS; SUBCUTANEOUS at 05:15

## 2024-10-27 RX ADMIN — MORPHINE SULFATE 2 MILLIGRAM(S): 30 TABLET, EXTENDED RELEASE ORAL at 09:47

## 2024-10-27 RX ADMIN — Medication 2 MILLIGRAM(S): at 20:03

## 2024-10-27 RX ADMIN — LIDOCAINE HYDROCHLORIDE 1 PATCH: 40 SOLUTION TOPICAL at 22:22

## 2024-10-27 RX ADMIN — LIDOCAINE HYDROCHLORIDE 1 PATCH: 40 SOLUTION TOPICAL at 09:18

## 2024-10-27 RX ADMIN — POLYETHYLENE GLYCOL 3350 17 GRAM(S): 17 POWDER, FOR SOLUTION ORAL at 22:22

## 2024-10-27 RX ADMIN — HEPARIN SODIUM 5000 UNIT(S): 10000 INJECTION INTRAVENOUS; SUBCUTANEOUS at 13:55

## 2024-10-27 RX ADMIN — LIDOCAINE HYDROCHLORIDE 1 PATCH: 40 SOLUTION TOPICAL at 08:41

## 2024-10-27 RX ADMIN — HEPARIN SODIUM 5000 UNIT(S): 10000 INJECTION INTRAVENOUS; SUBCUTANEOUS at 22:22

## 2024-10-27 RX ADMIN — KETOROLAC TROMETHAMINE 30 MILLIGRAM(S): 30 INJECTION INTRAMUSCULAR; INTRAVENOUS at 16:47

## 2024-10-27 RX ADMIN — Medication 975 MILLIGRAM(S): at 22:21

## 2024-10-27 NOTE — CHART NOTE - NSCHARTNOTEFT_GEN_A_CORE
MR L-spine reviewed, moderate Lt S1 foraminal stenosis but no central stenosis, no significant stenosis at other levels. Clinically stable. No plan for surgical intervention this admission.    - No further neurosurgical intervention at this time  - Pain management/PT per primary team  - May f/u outpatient with Dr. Gan in 1-2 weeks from discharge  - Reconsult neurosurgery as needed

## 2024-10-28 LAB
ANION GAP SERPL CALC-SCNC: 14 MMOL/L — SIGNIFICANT CHANGE UP (ref 5–17)
BUN SERPL-MCNC: 15 MG/DL — SIGNIFICANT CHANGE UP (ref 7–23)
CALCIUM SERPL-MCNC: 9.4 MG/DL — SIGNIFICANT CHANGE UP (ref 8.4–10.5)
CHLORIDE SERPL-SCNC: 101 MMOL/L — SIGNIFICANT CHANGE UP (ref 96–108)
CO2 SERPL-SCNC: 25 MMOL/L — SIGNIFICANT CHANGE UP (ref 22–31)
CREAT SERPL-MCNC: 1.24 MG/DL — SIGNIFICANT CHANGE UP (ref 0.5–1.3)
EGFR: 72 ML/MIN/1.73M2 — SIGNIFICANT CHANGE UP
GLUCOSE SERPL-MCNC: 80 MG/DL — SIGNIFICANT CHANGE UP (ref 70–99)
HCT VFR BLD CALC: 46.8 % — SIGNIFICANT CHANGE UP (ref 39–50)
HGB BLD-MCNC: 14.5 G/DL — SIGNIFICANT CHANGE UP (ref 13–17)
MAGNESIUM SERPL-MCNC: 2.2 MG/DL — SIGNIFICANT CHANGE UP (ref 1.6–2.6)
MCHC RBC-ENTMCNC: 25.1 PG — LOW (ref 27–34)
MCHC RBC-ENTMCNC: 31 GM/DL — LOW (ref 32–36)
MCV RBC AUTO: 81.1 FL — SIGNIFICANT CHANGE UP (ref 80–100)
NRBC # BLD: 0 /100 WBCS — SIGNIFICANT CHANGE UP (ref 0–0)
PHOSPHATE SERPL-MCNC: 3.5 MG/DL — SIGNIFICANT CHANGE UP (ref 2.5–4.5)
PLATELET # BLD AUTO: 257 K/UL — SIGNIFICANT CHANGE UP (ref 150–400)
POTASSIUM SERPL-MCNC: 3.9 MMOL/L — SIGNIFICANT CHANGE UP (ref 3.5–5.3)
POTASSIUM SERPL-SCNC: 3.9 MMOL/L — SIGNIFICANT CHANGE UP (ref 3.5–5.3)
RBC # BLD: 5.77 M/UL — SIGNIFICANT CHANGE UP (ref 4.2–5.8)
RBC # FLD: 12.6 % — SIGNIFICANT CHANGE UP (ref 10.3–14.5)
SODIUM SERPL-SCNC: 140 MMOL/L — SIGNIFICANT CHANGE UP (ref 135–145)
WBC # BLD: 7.25 K/UL — SIGNIFICANT CHANGE UP (ref 3.8–10.5)
WBC # FLD AUTO: 7.25 K/UL — SIGNIFICANT CHANGE UP (ref 3.8–10.5)

## 2024-10-28 PROCEDURE — 99253 IP/OBS CNSLTJ NEW/EST LOW 45: CPT

## 2024-10-28 PROCEDURE — 93010 ELECTROCARDIOGRAM REPORT: CPT

## 2024-10-28 RX ORDER — LISINOPRIL 40 MG
10 TABLET ORAL DAILY
Refills: 0 | Status: DISCONTINUED | OUTPATIENT
Start: 2024-10-28 | End: 2024-10-29

## 2024-10-28 RX ORDER — INFLUENZ VIR VAC TV P-SURF2003 15MCG/.5ML
0.5 SYRINGE (ML) INTRAMUSCULAR ONCE
Refills: 0 | Status: DISCONTINUED | OUTPATIENT
Start: 2024-10-28 | End: 2024-11-01

## 2024-10-28 RX ORDER — DEXAMETHASONE 1.5 MG 1.5 MG/1
10 TABLET ORAL EVERY 6 HOURS
Refills: 0 | Status: COMPLETED | OUTPATIENT
Start: 2024-10-28 | End: 2024-10-29

## 2024-10-28 RX ORDER — DEXAMETHASONE 1.5 MG 1.5 MG/1
10 TABLET ORAL EVERY 6 HOURS
Refills: 0 | Status: DISCONTINUED | OUTPATIENT
Start: 2024-10-28 | End: 2024-10-28

## 2024-10-28 RX ADMIN — POLYETHYLENE GLYCOL 3350 17 GRAM(S): 17 POWDER, FOR SOLUTION ORAL at 11:42

## 2024-10-28 RX ADMIN — PREGABALIN 150 MILLIGRAM(S): 150 CAPSULE ORAL at 17:28

## 2024-10-28 RX ADMIN — Medication 2 MILLIGRAM(S): at 13:50

## 2024-10-28 RX ADMIN — HEPARIN SODIUM 5000 UNIT(S): 10000 INJECTION INTRAVENOUS; SUBCUTANEOUS at 12:56

## 2024-10-28 RX ADMIN — Medication 5 MILLIGRAM(S): at 05:31

## 2024-10-28 RX ADMIN — Medication 2 MILLIGRAM(S): at 06:31

## 2024-10-28 RX ADMIN — PREGABALIN 150 MILLIGRAM(S): 150 CAPSULE ORAL at 05:31

## 2024-10-28 RX ADMIN — LIDOCAINE HYDROCHLORIDE 1 PATCH: 40 SOLUTION TOPICAL at 11:53

## 2024-10-28 RX ADMIN — Medication 2 MILLIGRAM(S): at 05:31

## 2024-10-28 RX ADMIN — Medication 2 MILLIGRAM(S): at 12:55

## 2024-10-28 RX ADMIN — Medication 1 MILLIGRAM(S): at 09:36

## 2024-10-28 RX ADMIN — HEPARIN SODIUM 5000 UNIT(S): 10000 INJECTION INTRAVENOUS; SUBCUTANEOUS at 21:47

## 2024-10-28 RX ADMIN — LIDOCAINE HYDROCHLORIDE 1 PATCH: 40 SOLUTION TOPICAL at 07:54

## 2024-10-28 RX ADMIN — Medication 1000 MICROGRAM(S): at 11:42

## 2024-10-28 RX ADMIN — METHOCARBAMOL 750 MILLIGRAM(S): 500 TABLET ORAL at 05:32

## 2024-10-28 RX ADMIN — METHOCARBAMOL 750 MILLIGRAM(S): 500 TABLET ORAL at 17:27

## 2024-10-28 RX ADMIN — LIDOCAINE HYDROCHLORIDE 1 PATCH: 40 SOLUTION TOPICAL at 21:48

## 2024-10-28 RX ADMIN — Medication 2 MILLIGRAM(S): at 20:18

## 2024-10-28 RX ADMIN — DEXAMETHASONE 1.5 MG 100 MILLIGRAM(S): 1.5 TABLET ORAL at 11:42

## 2024-10-28 RX ADMIN — Medication 2 TABLET(S): at 21:48

## 2024-10-28 RX ADMIN — Medication 1 MILLIGRAM(S): at 09:02

## 2024-10-28 RX ADMIN — Medication 10 MILLIGRAM(S): at 22:47

## 2024-10-28 RX ADMIN — DEXAMETHASONE 1.5 MG 100 MILLIGRAM(S): 1.5 TABLET ORAL at 18:37

## 2024-10-28 RX ADMIN — Medication 1000 MILLILITER(S): at 21:49

## 2024-10-28 RX ADMIN — Medication 2 MILLIGRAM(S): at 21:18

## 2024-10-28 RX ADMIN — HEPARIN SODIUM 5000 UNIT(S): 10000 INJECTION INTRAVENOUS; SUBCUTANEOUS at 05:32

## 2024-10-28 NOTE — PATIENT PROFILE ADULT - SAFE PLACE TO LIVE
Physical Therapy Treatment Note     Name: Hola Payton [facility] Number: [id number]    Therapy Diagnosis:   No diagnosis found.  Physician: Rowena Ramirez MD    Visit Date: 4/25/2022  Physician Orders: PT Eval and Treat   Medical Diagnosis from Referral: M54.50,G89.29 (ICD-10-CM) - Chronic bilateral low back pain without sciatica  Evaluation Date: 3/15/2022  Authorization Period Expiration: 5/27/2022 Medicaid approval expires  Plan of Care Expiration: 5/13/2022  Progress Note Due: 4/15/2022  Visit # / Visits authorized: 9/ 17  FOTO: 50/60    Time In: 1555  Time Out: 1640  Total Billable Time: 45 minutes    Precautions: Standard    Subjective     Pt reports: continuing to improve, just came from pysch visit and a little frustrated    Response to previous treatment: some soreness in legs   Functional change: none    Pain: 2/10  Location: lower lumbar    Objective     Hola received therapeutic exercises to develop strength, endurance, ROM, flexibility, posture and core stabilization for 30 minutes including:  bike x 5 min  Incline board x 1 min  ltr x 10 with 10 sec hold with overpressure  Piriformis stretch 3 x 30 second hold manual  NOT THIS VISIT   Double knee to chest 5 x 30 second hold manual NOT THIS VISIT   Pelvic tilt 10 x 10 second hold  Bridges with blue band x 20   Dead bug reaches with blue band around knees x 10 with 3 sec hold Alexander  Abdominal crunch level 2 x 20 NOT THIS VISIT   Hamstring stretch 2 x 60 second hold manual  Sciatic nerve glide x 5 seated  horzontial ABDUCTION UE in prone x 10 with 3 sec hold NOT THIS VISIT   Left sidelying rotation 10 x10 sh manual over ball  Seated flexion with silver ball:  Left 5 x 10sh, right 5x10sh  cybex hip abduction #3 x 30 alexander NOT THIS VISIT   cybex hip / #4 x 30 alexander NOT THIS VISIT  Prone hip extension with opp UE lift x 10 with 3 sec hold  Quad rocking x 8 with 5 sec hold    Hola received the following manual therapy techniques: Joint mobilizations,  Manual traction, Myofacial release, Soft tissue Mobilization and Friction Massage were applied to the: 0 for 0 minutes, including:  Prone press up x 10 with Glute set  Left long axis distraction x 10    Hola participated in neuromuscular re-education activities to improve: Balance, Coordination, Kinesthetic, Sense, Proprioception and Posture for 0 minutes. The following activities were included:  0    Hola participated in dynamic functional therapeutic activities to improve functional performance for 0  minutes, includin    Hola participated in gait training to improve functional mobility and safety for 0  minutes, includin    Hola received the following direct contact modalities after being cleared for contraindications: 0    Hola received the following supervised modalities after being cleared for contraindications: INTERFERENTIAL x 7 volts x 0 minutes to lumbar spine.  Patient tolerated /s any adverse affects.    Hola received hot pack for 0 minutes to 0.    Hola received cold pack for 0 minutes to 0.      Home Exercises Provided and Patient Education Provided     Education provided:   - HEP    Written Home Exercises Provided: yes.  Exercises were reviewed and Hola was able to demonstrate them prior to the end of the session.  Hola demonstrated fair  understanding of the education provided.     See EMR under Patient Instructions for exercises provided prior visit.    Assessment    Patient did okay with ex without c/o pain, no signs of fatigue. Weakness in gluts.     Pt prognosis is Fair.     Pt will continue to benefit from skilled outpatient physical therapy to address the deficits listed in the problem list box on initial evaluation, provide pt/family education and to maximize pt's level of independence in the home and community environment.     Pt's spiritual, cultural and educational needs considered and pt agreeable to plan of care and goals.     Anticipated barriers to  physical therapy: chronic pain    Goals:  Short Term Goals: 4 weeks   1.  Decrease pain worst to 7/10  2.  Tolerate 45 minutes of exercise with <7/10 pain in lumbar spine.     Long Term Goals: 8 weeks   1.  Decrease pain worst to 5/10  2.  Tolerate 60 minutes of exercise/activity with <5/10 pain in back  3.  Generate a FOTO score of >60    Plan                                                                                       2x/wk x 8 wks  CPT code CPT name Units/visit units/wk Units/treatment period   03123  Gt train 1 2 16   23882 Therex 3 6 48   99735 NMR 1 2 16   61621 US 1 2 16   51675 Traction 1 2 16   38433 IFC 1 2 16   20288 Manual 2 2 32        Continue with current plan of care.    Tamanna Rao, PTA     3 therex [id number]       no

## 2024-10-28 NOTE — CONSULT NOTE ADULT - SUBJECTIVE AND OBJECTIVE BOX
Patient is a 49yMale community ambulator without assistive devices who presents to [facility] ED w/ a c/o of LLE radicular pain. Patient states he was bending over to  an object and experienced exacerbation of his prior LLE radicular pain. Pt reports hx of 1.5 years of lumbar radicular pain down his LLE. He reports seeing his pain doctor a month ago for an RUFUS, which provided relief up until this recent exacerbation. Endorses intermittent numbness in his LLE. Denies any previous orthopaedic history. No other orthopaedic concerns at this time.     PAST MEDICAL & SURGICAL HISTORY:  No pertinent past medical history      No significant past surgical history          Vital Signs Last 24 Hrs  T(C): 36.9 (28 Oct 2024 08:54), Max: 37.1 (28 Oct 2024 04:12)  T(F): 98.4 (28 Oct 2024 08:54), Max: 98.8 (28 Oct 2024 04:12)  HR: 94 (28 Oct 2024 09:35) (71 - 98)  BP: 150/83 (28 Oct 2024 09:35) (148/98 - 187/121)  BP(mean): --  RR: 18 (28 Oct 2024 08:54) (18 - 18)  SpO2: 99% (28 Oct 2024 08:54) (98% - 99%)    Parameters below as of 28 Oct 2024 08:54  Patient On (Oxygen Delivery Method): room air      I&O's Detail    27 Oct 2024 07:01  -  28 Oct 2024 07:00  --------------------------------------------------------  IN:    Oral Fluid: 360 mL  Total IN: 360 mL    OUT:    Voided (mL): 400 mL  Total OUT: 400 mL    Total NET: -40 mL      28 Oct 2024 07:01  -  28 Oct 2024 11:01  --------------------------------------------------------  IN:    Oral Fluid: 120 mL  Total IN: 120 mL    OUT:  Total OUT: 0 mL    Total NET: 120 mL          LABS:                        14.5   7.25  )-----------( 257      ( 28 Oct 2024 09:08 )             46.8     10-28    140  |  101  |  15  ----------------------------<  80  3.9   |  25  |  1.24    Ca    9.4      28 Oct 2024 09:08  Phos  3.5     10-28  Mg     2.2     10-28    TPro  7.4  /  Alb  4.6  /  TBili  0.4  /  DBili  x   /  AST  21  /  ALT  40  /  AlkPhos  65  10-26      Urinalysis Basic - ( 28 Oct 2024 09:08 )    Color: x / Appearance: x / SG: x / pH: x  Gluc: 80 mg/dL / Ketone: x  / Bili: x / Urobili: x   Blood: x / Protein: x / Nitrite: x   Leuk Esterase: x / RBC: x / WBC x   Sq Epi: x / Non Sq Epi: x / Bacteria: x        PHYSICAL EXAM:  General; Awake and alert, Oriented x 3  Spine: L spine paraspinals TTP L>R. No TTP over spinous processes or paraspinal muscles at C/T spine. No palpable step off.     Pain w left SLR           Motor exam:        C5       C6       C7       C8       T1   R  5/5      5/5     5/5     5/5      5/5  L   5/5      5/5     5/5     5/5      5/5         L2        L3       L4       L5      S1  R  5/5      5/5     5/5     5/5    5/5  L   4*/5     4*/5      4*/5     4*/5    4*/5  *Pain limited motor exam    Sensory:  (0=absent, 1=impaired, 2=normal, NT=not testable)      C5    C6   C7   C8   T1         R   2     2      2     2      2  L    2     2      2     2      2        L2    L3   L4   L5   S1   R   2     2     2     2     2  L    1     1     1     1     1      Secondary  Skin intact. No erythema/ecchymosis. No TTP over bony prominences, SILT, palpable pulses, full/painless A/PROM, compartments soft. No other injuries or complaints. Negative logroll/heelstrike BL.     Imaging:  MR Lspine showing left sided neuroforaminal stenosis at L4-5                                          A/P :   Patient is a 49yMale w/LLE radicular pain s/p recent RUFUS 1 month ago, with recent exacerbation     -Plan for nonoperative management  -IV decadron 10mg q6h x4 doses STAT  -C/w multimodal pain control  -WBAT PT/OT  -DVT ppx per primary  -Plan discussed w pt, in agreement w the above   Patient is a 49yMale community ambulator without assistive devices who presents to [facility] ED w/ a c/o of LLE radicular pain. Patient states he was bending over to  an object and experienced exacerbation of his prior LLE radicular pain. Pt reports hx of 1.5 years of lumbar radicular pain down his LLE. He reports seeing his pain doctor a month ago for an RUFUS, which provided relief up until this recent exacerbation. Endorses intermittent numbness in his LLE. Denies any previous orthopaedic history. No other orthopaedic concerns at this time.     PAST MEDICAL & SURGICAL HISTORY:  No pertinent past medical history      No significant past surgical history          Vital Signs Last 24 Hrs  T(C): 36.9 (28 Oct 2024 08:54), Max: 37.1 (28 Oct 2024 04:12)  T(F): 98.4 (28 Oct 2024 08:54), Max: 98.8 (28 Oct 2024 04:12)  HR: 94 (28 Oct 2024 09:35) (71 - 98)  BP: 150/83 (28 Oct 2024 09:35) (148/98 - 187/121)  BP(mean): --  RR: 18 (28 Oct 2024 08:54) (18 - 18)  SpO2: 99% (28 Oct 2024 08:54) (98% - 99%)    Parameters below as of 28 Oct 2024 08:54  Patient On (Oxygen Delivery Method): room air      I&O's Detail    27 Oct 2024 07:01  -  28 Oct 2024 07:00  --------------------------------------------------------  IN:    Oral Fluid: 360 mL  Total IN: 360 mL    OUT:    Voided (mL): 400 mL  Total OUT: 400 mL    Total NET: -40 mL      28 Oct 2024 07:01  -  28 Oct 2024 11:01  --------------------------------------------------------  IN:    Oral Fluid: 120 mL  Total IN: 120 mL    OUT:  Total OUT: 0 mL    Total NET: 120 mL          LABS:                        14.5   7.25  )-----------( 257      ( 28 Oct 2024 09:08 )             46.8     10-28    140  |  101  |  15  ----------------------------<  80  3.9   |  25  |  1.24    Ca    9.4      28 Oct 2024 09:08  Phos  3.5     10-28  Mg     2.2     10-28    TPro  7.4  /  Alb  4.6  /  TBili  0.4  /  DBili  x   /  AST  21  /  ALT  40  /  AlkPhos  65  10-26      Urinalysis Basic - ( 28 Oct 2024 09:08 )    Color: x / Appearance: x / SG: x / pH: x  Gluc: 80 mg/dL / Ketone: x  / Bili: x / Urobili: x   Blood: x / Protein: x / Nitrite: x   Leuk Esterase: x / RBC: x / WBC x   Sq Epi: x / Non Sq Epi: x / Bacteria: x        PHYSICAL EXAM:  General; Awake and alert, Oriented x 3  Spine: L spine paraspinals TTP L>R. No TTP over spinous processes or paraspinal muscles at C/T spine. No palpable step off.     Pain w left SLR           Motor exam:        C5       C6       C7       C8       T1   R  5/5      5/5     5/5     5/5      5/5  L   5/5      5/5     5/5     5/5      5/5         L2        L3       L4       L5      S1  R  5/5      5/5     5/5     5/5    5/5  L   5/5     5/5      5/5     /5    5/5  Bilaterally, I could not break his motor strength in his LE when the patient gave good effort    Sensory:  (0=absent, 1=impaired, 2=normal, NT=not testable)      C5    C6   C7   C8   T1         R   2     2      2     2      2  L    2     2      2     2      2        L2    L3   L4   L5   S1   R   2     2     2     2     2  L   2    2     2     2     2      Secondary  Skin intact. No erythema/ecchymosis. No TTP over bony prominences, SILT, palpable pulses, full/painless A/PROM, compartments soft. No other injuries or complaints. Negative logroll/heelstrike BL.     Imaging:  MR Lspine showing left sided neuroforaminal stenosis at L4-5                                          A/P :   Patient is a 49yMale w/LLE radicular pain s/p recent RUFUS 1 month ago, with recent exacerbation     -Plan for nonoperative management  -IV decadron 10mg q6h x4 doses STAT  -C/w multimodal pain control  -WBAT PT/OT  -DVT ppx per primary  -Plan discussed w pt, in agreement w the above

## 2024-10-28 NOTE — CONSULT NOTE ADULT - ATTENDING COMMENTS
49 year old male with chronic lumbar radiculopathy. The only finding on his MRI is moderate L4-L5 left sided foraminal stenosis. He has no red flag symptoms. I was able to walk with him 40 feet in the [place] without difficulty/assistive devices. No bowel/bladder complaints, no saddle anesthesia. He is developing significant bowel distension and was hiccuping when I first spoke with him. He needs to make sure he does not develop ileus and I would monitor/lessen narcotic intake. I explained this to the patient and the patient's family at length. They were in agreement.

## 2024-10-28 NOTE — PATIENT PROFILE ADULT - FALL HARM RISK - HARM RISK INTERVENTIONS

## 2024-10-29 LAB
APTT BLD: 28.4 SEC — SIGNIFICANT CHANGE UP (ref 24.5–35.6)
CK MB CFR SERPL CALC: 2.1 NG/ML — SIGNIFICANT CHANGE UP (ref 0–6.7)
CK SERPL-CCNC: 93 U/L — SIGNIFICANT CHANGE UP (ref 30–200)
D DIMER BLD IA.RAPID-MCNC: <150 NG/ML DDU — SIGNIFICANT CHANGE UP
TROPONIN T, HIGH SENSITIVITY RESULT: 10 NG/L — SIGNIFICANT CHANGE UP (ref 0–51)
TROPONIN T, HIGH SENSITIVITY RESULT: 13 NG/L — SIGNIFICANT CHANGE UP (ref 0–51)

## 2024-10-29 PROCEDURE — 74018 RADEX ABDOMEN 1 VIEW: CPT | Mod: 26

## 2024-10-29 PROCEDURE — 93308 TTE F-UP OR LMTD: CPT | Mod: 26

## 2024-10-29 PROCEDURE — 93321 DOPPLER ECHO F-UP/LMTD STD: CPT | Mod: 26

## 2024-10-29 PROCEDURE — 74177 CT ABD & PELVIS W/CONTRAST: CPT | Mod: 26

## 2024-10-29 PROCEDURE — 99254 IP/OBS CNSLTJ NEW/EST MOD 60: CPT

## 2024-10-29 PROCEDURE — 93325 DOPPLER ECHO COLOR FLOW MAPG: CPT | Mod: 26

## 2024-10-29 RX ORDER — GABAPENTIN 300 MG/1
300 CAPSULE ORAL ONCE
Refills: 0 | Status: COMPLETED | OUTPATIENT
Start: 2024-10-29 | End: 2024-10-29

## 2024-10-29 RX ORDER — HYDROMORPHONE HCL/0.9% NACL/PF 6 MG/30 ML
4 PATIENT CONTROLLED ANALGESIA SYRINGE INTRAVENOUS EVERY 4 HOURS
Refills: 0 | Status: DISCONTINUED | OUTPATIENT
Start: 2024-10-29 | End: 2024-11-01

## 2024-10-29 RX ORDER — HYDROMORPHONE HCL/0.9% NACL/PF 6 MG/30 ML
2 PATIENT CONTROLLED ANALGESIA SYRINGE INTRAVENOUS EVERY 6 HOURS
Refills: 0 | Status: DISCONTINUED | OUTPATIENT
Start: 2024-10-29 | End: 2024-10-30

## 2024-10-29 RX ORDER — METOPROLOL TARTRATE 50 MG
25 TABLET ORAL ONCE
Refills: 0 | Status: COMPLETED | OUTPATIENT
Start: 2024-10-29 | End: 2024-10-29

## 2024-10-29 RX ORDER — CYCLOBENZAPRINE HYDROCHLORIDE 30 MG/1
5 CAPSULE, EXTENDED RELEASE ORAL THREE TIMES A DAY
Refills: 0 | Status: DISCONTINUED | OUTPATIENT
Start: 2024-10-29 | End: 2024-10-29

## 2024-10-29 RX ORDER — METOPROLOL TARTRATE 50 MG
12.5 TABLET ORAL
Refills: 0 | Status: DISCONTINUED | OUTPATIENT
Start: 2024-10-29 | End: 2024-11-01

## 2024-10-29 RX ORDER — TIZANIDINE 2 MG/1
4 TABLET ORAL EVERY 6 HOURS
Refills: 0 | Status: DISCONTINUED | OUTPATIENT
Start: 2024-10-29 | End: 2024-11-01

## 2024-10-29 RX ORDER — ACETAMINOPHEN 500 MG
650 TABLET ORAL EVERY 6 HOURS
Refills: 0 | Status: DISCONTINUED | OUTPATIENT
Start: 2024-10-29 | End: 2024-11-01

## 2024-10-29 RX ADMIN — Medication 2 MILLIGRAM(S): at 03:20

## 2024-10-29 RX ADMIN — Medication 4 MILLIGRAM(S): at 22:11

## 2024-10-29 RX ADMIN — KETOROLAC TROMETHAMINE 30 MILLIGRAM(S): 30 INJECTION INTRAMUSCULAR; INTRAVENOUS at 01:36

## 2024-10-29 RX ADMIN — KETOROLAC TROMETHAMINE 30 MILLIGRAM(S): 30 INJECTION INTRAMUSCULAR; INTRAVENOUS at 00:36

## 2024-10-29 RX ADMIN — LIDOCAINE HYDROCHLORIDE 1 PATCH: 40 SOLUTION TOPICAL at 22:10

## 2024-10-29 RX ADMIN — GABAPENTIN 300 MILLIGRAM(S): 300 CAPSULE ORAL at 00:54

## 2024-10-29 RX ADMIN — Medication 25 MILLIGRAM(S): at 00:54

## 2024-10-29 RX ADMIN — DEXAMETHASONE 1.5 MG 100 MILLIGRAM(S): 1.5 TABLET ORAL at 05:09

## 2024-10-29 RX ADMIN — METHOCARBAMOL 750 MILLIGRAM(S): 500 TABLET ORAL at 05:08

## 2024-10-29 RX ADMIN — TIZANIDINE 4 MILLIGRAM(S): 2 TABLET ORAL at 17:09

## 2024-10-29 RX ADMIN — POLYETHYLENE GLYCOL 3350 17 GRAM(S): 17 POWDER, FOR SOLUTION ORAL at 13:45

## 2024-10-29 RX ADMIN — Medication 12.5 MILLIGRAM(S): at 17:09

## 2024-10-29 RX ADMIN — Medication 650 MILLIGRAM(S): at 23:11

## 2024-10-29 RX ADMIN — Medication 1000 MICROGRAM(S): at 13:45

## 2024-10-29 RX ADMIN — LIDOCAINE HYDROCHLORIDE 1 PATCH: 40 SOLUTION TOPICAL at 08:54

## 2024-10-29 RX ADMIN — Medication 2 MILLIGRAM(S): at 11:39

## 2024-10-29 RX ADMIN — CYCLOBENZAPRINE HYDROCHLORIDE 5 MILLIGRAM(S): 30 CAPSULE, EXTENDED RELEASE ORAL at 13:45

## 2024-10-29 RX ADMIN — Medication 2 TABLET(S): at 22:09

## 2024-10-29 RX ADMIN — Medication 2 MILLIGRAM(S): at 02:20

## 2024-10-29 RX ADMIN — Medication 12.5 MILLIGRAM(S): at 05:08

## 2024-10-29 RX ADMIN — DEXAMETHASONE 1.5 MG 100 MILLIGRAM(S): 1.5 TABLET ORAL at 00:01

## 2024-10-29 RX ADMIN — Medication 650 MILLIGRAM(S): at 17:09

## 2024-10-29 RX ADMIN — Medication 5 MILLIGRAM(S): at 22:10

## 2024-10-29 RX ADMIN — LIDOCAINE HYDROCHLORIDE 1 PATCH: 40 SOLUTION TOPICAL at 09:09

## 2024-10-29 RX ADMIN — HEPARIN SODIUM 5000 UNIT(S): 10000 INJECTION INTRAVENOUS; SUBCUTANEOUS at 05:09

## 2024-10-29 RX ADMIN — Medication 10 MILLIGRAM(S): at 18:26

## 2024-10-29 RX ADMIN — HEPARIN SODIUM 5000 UNIT(S): 10000 INJECTION INTRAVENOUS; SUBCUTANEOUS at 13:45

## 2024-10-29 RX ADMIN — PREGABALIN 150 MILLIGRAM(S): 150 CAPSULE ORAL at 05:09

## 2024-10-29 RX ADMIN — TIZANIDINE 4 MILLIGRAM(S): 2 TABLET ORAL at 23:11

## 2024-10-29 RX ADMIN — HEPARIN SODIUM 5000 UNIT(S): 10000 INJECTION INTRAVENOUS; SUBCUTANEOUS at 22:09

## 2024-10-29 RX ADMIN — Medication 2 MILLIGRAM(S): at 11:06

## 2024-10-29 RX ADMIN — Medication 4 MILLIGRAM(S): at 21:11

## 2024-10-29 RX ADMIN — PREGABALIN 150 MILLIGRAM(S): 150 CAPSULE ORAL at 17:09

## 2024-10-29 NOTE — PHYSICAL THERAPY INITIAL EVALUATION ADULT - PERTINENT HX OF CURRENT PROBLEM, REHAB EVAL
49 y.o. M w/ LBP and L sided radiculopathy in L5/S1 distriubtion x1.5 yrs p/w worsening radic x 1mo. No bladder/ bowel symptoms. No saddle anesthesia. O/p pain management x 1.5 yrs, currently on Meloxicam, Robixin, Pregabalin 70, s/p RUFUS 1 mo ago. CT L spine no acute fx, but disc bulging L3/4, L4/5, L5/S1, causing b/l foraminal stenosis (L>R)

## 2024-10-29 NOTE — CONSULT NOTE ADULT - SUBJECTIVE AND OBJECTIVE BOX
Chief Complaint:  Patient is a 49y old  Male who presents with a chief complaint of Back pain (28 Oct 2024 14:14)    HPI:   48yo male with PMHx of Bulging L4-5 s/p Epidural one month ago by Dr. Reed presents to ED with recurrent worsening left low back pain after bending over yesterday. Reports he's had low back pain with radiating pain to LLE since 1.5 years ago and f/ued with pain management Dr. Reed. He trialed PT and Robaxin, Lyrica and Meloxicam without pain relief. He had RUFUS one month ago and noticed significant improvement. He felt recurrent sharp pain to low back with radiating LLE pain after bending over yesterday. He tool Oxycodone, Robaxin, and Naproxen without relief and he couldn't walk this morning. He also c/o LLE weakness today.        Current Pain Score: 10/10    Current out- patient pain regimen: lyrica 75 mg BID; oxy IR 5 mg QID PRN    Out Patient Pain Management provider: Pilar Reed MD    [facility] Prescription Monitoring Program: Reference #[id number]    PAST MEDICAL & SURGICAL HISTORY:  No pertinent past medical history      No significant past surgical history      SOCIAL HISTORY:  Tobacco Use: denies  Alcohol Use: social  Recreational Marijuana: denies  Illicit Drug Use: denies    Opioid Risk Tool (ORT-OUD) Score: low      Allergies    No Known Allergies    Intolerances        REVIEW OF SYSTEMS:  CONSTITUTIONAL: No fever, weight loss, fatigue, falls  NEURO: No headaches, memory loss, tremors, dizziness or blurred vision  RESP: No shortness of breath, cough  CV: No chest pain, palpitations  GI: No abdominal pain, nausea, vomiting, diarrhea, constipation   : No urinary incontinence/retention, dysuria  MSK: No joint pain; + low back pain with LLE pain; no upper or lower motor strength weakness; no saddle anesthesia   SKIN: No itching, burning, rashes   PSYCHIATRIC: No depression, anxiety, mood swings, or difficulty sleeping      MEDICATIONS  (STANDING):  acetaminophen     Tablet .. 650 milliGRAM(s) Oral every 6 hours  bisacodyl 5 milliGRAM(s) Oral at bedtime  cyanocobalamin 1000 MICROGram(s) Oral daily  heparin   Injectable 5000 Unit(s) SubCutaneous every 8 hours  influenza   Vaccine 0.5 milliLiter(s) IntraMuscular once  lidocaine   4% Patch 1 Patch Transdermal every 24 hours  metoprolol tartrate 12.5 milliGRAM(s) Oral two times a day  polyethylene glycol 3350 17 Gram(s) Oral daily  pregabalin 150 milliGRAM(s) Oral two times a day  senna 2 Tablet(s) Oral at bedtime  tiZANidine 4 milliGRAM(s) Oral every 6 hours    MEDICATIONS  (PRN):  acetaminophen     Tablet .. 975 milliGRAM(s) Oral every 6 hours PRN Temp greater or equal to 38C (100.4F), Mild Pain (1 - 3)  HYDROmorphone   Tablet 4 milliGRAM(s) Oral every 4 hours PRN Severe Pain (7 - 10)  HYDROmorphone  Injectable 2 milliGRAM(s) IV Push every 6 hours PRN severe breakthrough pain      PHYSICAL EXAM  GENERAL: seen at bedside, wife present; moderate distress; well developed, well groomed; no signs of toxicity  HEENT: head atraumatic, normocephalic; anicteric; speech clear and fluent  NEURO: A + O X 3; good concentration; Cranial Nerves II- XII intact; SILT; + antalgic gait  GI: abdomen soft, non distended; + bowel sounds; + BM; appetite fair  :  voiding  EXTREMITIES/ PV: SERRATO; 2+ peripheral pulses; no cyanosis, edema or clubbing  MUSCULOSKELETAL: motor strength 5/5 B/L LE except L HF and quad 4+/5; currently OOB with walker and PT  SKIN: no rashes, lesions   PSYCH: affect normal; good eye contact; no signs of depression or anxiety  Vital Signs:  T(C): 36.7 (10-29-24 @ 16:35)  HR: 99 (10-29-24 @ 16:35)  BP: 155/90 (10-29-24 @ 16:35)  RR: 18 (10-29-24 @ 16:35)  SpO2: 100% (10-29-24 @ 16:35)    Pertinent labs/radiology:                        14.5   7.25  )-----------( 257      ( 28 Oct 2024 09:08 )             46.8   10-28    140  |  101  |  15  ----------------------------<  80  3.9   |  25  |  1.24    Ca    9.4      28 Oct 2024 09:08  Phos  3.5     10-28  Mg     2.2     10-28    < from: MR Lumbar Spine No Cont (10.27.24 @ 16:59) >  IMPRESSION:  Nonspecific increased signal is noted within the interspinous ligament at   L4-L5 and L5-S1 which may reflect ligamentous sprain, posttraumatic or   degenerative change.  Disc bulging at L4-L5 contributes to moderate left foraminal narrowing.

## 2024-10-29 NOTE — PHYSICAL THERAPY INITIAL EVALUATION ADULT - PLANNED THERAPY INTERVENTIONS, PT EVAL
1. GOAL: In 3 weeks, pt will be able to navigate 6 steps independently./bed mobility training/gait training/transfer training

## 2024-10-29 NOTE — CHART NOTE - NSCHARTNOTEFT_GEN_A_CORE
Called to evaluate for tachycardia with hypertension  Background:  Copied text: Patient is a 49y Male w/LLE radicular pain s/p recent RUFUS 1 month ago, with recent exacerbation     -Plan for nonoperative management  -IV decadron 10mg q6h x4 doses STAT  -C/w multimodal pain control  -WBAT PT/OT      ACP Intervention:  HTN is new onset based on subjective report  Currently on Lisinopril 10 mg daily  Tonight he's tachycardic, no fever, non toxic appearing  not anemic, cbc reviewed  pain is bearable when laying flat; unbearable on movement or ambulation  Tachycardia did not respond to 1 L of Lactated Ringers fluids  EKG without acute ST changes  QTCs < 500   A/P new onset HTN with Tachycardia  Low suspicion for sepsis  - will d/c Lisinopril and start Beta Blocker  -will check trop  -will check d dimer  check CTA to R/O PE  -add Gabapentin to pain regime  patient may benefit from cards consult and pain management  JESSICA Mendez Called to evaluate for tachycardia with hypertension  Background:  Copied text: Patient is a 49y Male w/LLE radicular pain s/p recent RUFUS 1 month ago, with recent exacerbation     -Plan for nonoperative management  -IV decadron 10mg q6h x4 doses STAT  -C/w multimodal pain control  -WBAT PT/OT  VTE prophy. heparin SQ & PAS stockings    ACP Intervention:  HTN is new onset based on subjective report  Currently on Lisinopril 10 mg daily  Tonight he's tachycardic, no fever, non toxic appearing  not anemic, cbc reviewed  pain is bearable when laying flat; unbearable on movement or ambulation  Tachycardia did not respond to 1 L of Lactated Ringers fluids  EKG without acute ST changes  QTCs < 500   A/P new onset HTN with Tachycardia  Low suspicion for sepsis  - will d/c Lisinopril and start Beta Blocker  -will check trop  -will check d dimer  check CTA to R/O PE  -add Gabapentin to pain regime  patient may benefit from cards consult and pain management  JESSICA Mendez

## 2024-10-29 NOTE — PHYSICAL THERAPY INITIAL EVALUATION ADULT - ADDITIONAL COMMENTS
Pt resides in a pvt home w/ spouse, 4 steps to enter (+HR), 6 steps to negotiate inside. PTA pt was independent with all mobility & ADL's. Did not use an AD for ambulation.

## 2024-10-30 PROCEDURE — 99233 SBSQ HOSP IP/OBS HIGH 50: CPT

## 2024-10-30 RX ORDER — POLYETHYLENE GLYCOL 3350 17 G/17G
17 POWDER, FOR SOLUTION ORAL
Refills: 0 | Status: DISCONTINUED | OUTPATIENT
Start: 2024-10-30 | End: 2024-11-01

## 2024-10-30 RX ORDER — SIMETHICONE 80 MG/1
80 TABLET, CHEWABLE ORAL ONCE
Refills: 0 | Status: COMPLETED | OUTPATIENT
Start: 2024-10-30 | End: 2024-10-30

## 2024-10-30 RX ADMIN — Medication 2 TABLET(S): at 21:24

## 2024-10-30 RX ADMIN — HEPARIN SODIUM 5000 UNIT(S): 10000 INJECTION INTRAVENOUS; SUBCUTANEOUS at 13:03

## 2024-10-30 RX ADMIN — HEPARIN SODIUM 5000 UNIT(S): 10000 INJECTION INTRAVENOUS; SUBCUTANEOUS at 21:24

## 2024-10-30 RX ADMIN — SIMETHICONE 80 MILLIGRAM(S): 80 TABLET, CHEWABLE ORAL at 11:19

## 2024-10-30 RX ADMIN — Medication 650 MILLIGRAM(S): at 16:38

## 2024-10-30 RX ADMIN — LIDOCAINE HYDROCHLORIDE 1 PATCH: 40 SOLUTION TOPICAL at 07:26

## 2024-10-30 RX ADMIN — LIDOCAINE HYDROCHLORIDE 1 PATCH: 40 SOLUTION TOPICAL at 11:55

## 2024-10-30 RX ADMIN — Medication 650 MILLIGRAM(S): at 06:25

## 2024-10-30 RX ADMIN — TIZANIDINE 4 MILLIGRAM(S): 2 TABLET ORAL at 16:39

## 2024-10-30 RX ADMIN — Medication 650 MILLIGRAM(S): at 11:18

## 2024-10-30 RX ADMIN — POLYETHYLENE GLYCOL 3350 17 GRAM(S): 17 POWDER, FOR SOLUTION ORAL at 16:39

## 2024-10-30 RX ADMIN — Medication 650 MILLIGRAM(S): at 05:34

## 2024-10-30 RX ADMIN — Medication 4 MILLIGRAM(S): at 21:22

## 2024-10-30 RX ADMIN — Medication 5 MILLIGRAM(S): at 21:25

## 2024-10-30 RX ADMIN — Medication 1000 MICROGRAM(S): at 11:19

## 2024-10-30 RX ADMIN — PREGABALIN 150 MILLIGRAM(S): 150 CAPSULE ORAL at 05:34

## 2024-10-30 RX ADMIN — TIZANIDINE 4 MILLIGRAM(S): 2 TABLET ORAL at 05:34

## 2024-10-30 RX ADMIN — Medication 12.5 MILLIGRAM(S): at 16:39

## 2024-10-30 RX ADMIN — HEPARIN SODIUM 5000 UNIT(S): 10000 INJECTION INTRAVENOUS; SUBCUTANEOUS at 05:34

## 2024-10-30 RX ADMIN — Medication 650 MILLIGRAM(S): at 00:11

## 2024-10-30 RX ADMIN — PREGABALIN 150 MILLIGRAM(S): 150 CAPSULE ORAL at 16:38

## 2024-10-30 RX ADMIN — LIDOCAINE HYDROCHLORIDE 1 PATCH: 40 SOLUTION TOPICAL at 21:22

## 2024-10-30 RX ADMIN — Medication 4 MILLIGRAM(S): at 22:22

## 2024-10-30 RX ADMIN — TIZANIDINE 4 MILLIGRAM(S): 2 TABLET ORAL at 11:19

## 2024-10-31 PROCEDURE — 93970 EXTREMITY STUDY: CPT | Mod: 26

## 2024-10-31 PROCEDURE — 72156 MRI NECK SPINE W/O & W/DYE: CPT | Mod: 26

## 2024-10-31 PROCEDURE — 72158 MRI LUMBAR SPINE W/O & W/DYE: CPT | Mod: 26

## 2024-10-31 PROCEDURE — 72157 MRI CHEST SPINE W/O & W/DYE: CPT | Mod: 26

## 2024-10-31 RX ORDER — HYDROMORPHONE HCL/0.9% NACL/PF 6 MG/30 ML
1 PATIENT CONTROLLED ANALGESIA SYRINGE INTRAVENOUS EVERY 6 HOURS
Refills: 0 | Status: DISCONTINUED | OUTPATIENT
Start: 2024-10-31 | End: 2024-11-01

## 2024-10-31 RX ORDER — OXYCODONE HYDROCHLORIDE 30 MG/1
10 TABLET ORAL ONCE
Refills: 0 | Status: DISCONTINUED | OUTPATIENT
Start: 2024-10-31 | End: 2024-10-31

## 2024-10-31 RX ADMIN — Medication 4 MILLIGRAM(S): at 16:10

## 2024-10-31 RX ADMIN — TIZANIDINE 4 MILLIGRAM(S): 2 TABLET ORAL at 00:12

## 2024-10-31 RX ADMIN — Medication 650 MILLIGRAM(S): at 11:30

## 2024-10-31 RX ADMIN — HEPARIN SODIUM 5000 UNIT(S): 10000 INJECTION INTRAVENOUS; SUBCUTANEOUS at 16:10

## 2024-10-31 RX ADMIN — PREGABALIN 150 MILLIGRAM(S): 150 CAPSULE ORAL at 17:57

## 2024-10-31 RX ADMIN — OXYCODONE HYDROCHLORIDE 10 MILLIGRAM(S): 30 TABLET ORAL at 23:00

## 2024-10-31 RX ADMIN — Medication 1000 MICROGRAM(S): at 11:30

## 2024-10-31 RX ADMIN — Medication 650 MILLIGRAM(S): at 05:25

## 2024-10-31 RX ADMIN — PREGABALIN 150 MILLIGRAM(S): 150 CAPSULE ORAL at 05:26

## 2024-10-31 RX ADMIN — TIZANIDINE 4 MILLIGRAM(S): 2 TABLET ORAL at 17:58

## 2024-10-31 RX ADMIN — Medication 4 MILLIGRAM(S): at 16:59

## 2024-10-31 RX ADMIN — TIZANIDINE 4 MILLIGRAM(S): 2 TABLET ORAL at 05:26

## 2024-10-31 RX ADMIN — Medication 650 MILLIGRAM(S): at 00:12

## 2024-10-31 RX ADMIN — Medication 4 MILLIGRAM(S): at 01:22

## 2024-10-31 RX ADMIN — Medication 650 MILLIGRAM(S): at 17:57

## 2024-10-31 RX ADMIN — OXYCODONE HYDROCHLORIDE 10 MILLIGRAM(S): 30 TABLET ORAL at 22:00

## 2024-10-31 RX ADMIN — Medication 5 MILLIGRAM(S): at 21:59

## 2024-10-31 RX ADMIN — HEPARIN SODIUM 5000 UNIT(S): 10000 INJECTION INTRAVENOUS; SUBCUTANEOUS at 21:59

## 2024-10-31 RX ADMIN — HEPARIN SODIUM 5000 UNIT(S): 10000 INJECTION INTRAVENOUS; SUBCUTANEOUS at 05:26

## 2024-10-31 RX ADMIN — Medication 12.5 MILLIGRAM(S): at 11:31

## 2024-10-31 RX ADMIN — POLYETHYLENE GLYCOL 3350 17 GRAM(S): 17 POWDER, FOR SOLUTION ORAL at 05:26

## 2024-10-31 RX ADMIN — Medication 650 MILLIGRAM(S): at 01:12

## 2024-10-31 RX ADMIN — LIDOCAINE HYDROCHLORIDE 1 PATCH: 40 SOLUTION TOPICAL at 07:35

## 2024-10-31 RX ADMIN — TIZANIDINE 4 MILLIGRAM(S): 2 TABLET ORAL at 23:51

## 2024-10-31 RX ADMIN — Medication 650 MILLIGRAM(S): at 12:45

## 2024-10-31 RX ADMIN — Medication 4 MILLIGRAM(S): at 02:22

## 2024-10-31 RX ADMIN — TIZANIDINE 4 MILLIGRAM(S): 2 TABLET ORAL at 11:30

## 2024-10-31 RX ADMIN — Medication 2 TABLET(S): at 21:59

## 2024-10-31 RX ADMIN — Medication 650 MILLIGRAM(S): at 23:51

## 2024-10-31 RX ADMIN — POLYETHYLENE GLYCOL 3350 17 GRAM(S): 17 POWDER, FOR SOLUTION ORAL at 17:58

## 2024-10-31 RX ADMIN — Medication 12.5 MILLIGRAM(S): at 21:59

## 2024-10-31 RX ADMIN — LIDOCAINE HYDROCHLORIDE 1 PATCH: 40 SOLUTION TOPICAL at 21:59

## 2024-10-31 RX ADMIN — LIDOCAINE HYDROCHLORIDE 1 PATCH: 40 SOLUTION TOPICAL at 09:11

## 2024-10-31 NOTE — PROVIDER CONTACT NOTE (OTHER) - RECOMMENDATIONS
monitor BP?
reinstate bp meds and address pain
Notify provider.
assess Pt at bedside?
pain control? assess Pt?
Contact provider.
EKG? Bolus? Labs?
Notify provider.
notify provider

## 2024-10-31 NOTE — PROGRESS NOTE ADULT - ASSESSMENT
- Adm med for pain management/ PT  - Multimodal pain management per primary, consider gabapentin/pregabalin, robaxin/flexeril, scheduled tylelenol, lido patch  - MR L spine noncon  - Further plan pending MR L spine
    49 M with no medical problems p/w Intractable Back Pain       CT Lumbar spine shows   No evidence of acute osseous fracture or shekhar dislocation.  2.  Degenerative changes in the lower lumbar spine.  3.  Sacralization of the left L5 hemivertebra    MRI Lumbar spine shows Nonspecific increased signal is noted within the interspinous ligament at   L4-L5 and L5-S1 which may reflect ligamentous sprain, posttraumatic or   degenerative change. Disc bulging at L4-L5 contributes to moderate left foraminal narrowing.    Acute  Intractable Back pain   Neuro sx consulted   Ortho consulted   Recommend Decadron completed    Add Tylenol standing dose, Flexeril    Pain meds prn   Pain Management consulted     Continue lyrica 150 mg BID- current CrCl is 69.6.  Discontinue IV dilaudid 1 mg.  Start PO dilaudid 4 mg Q 4 hours PRN.  Continue IV dilaudid 2 mg Q 6 hours PRN severe breakthrough pain.  Discontinue flexeril and start zanaflex 4 mg Q 6 hours.  PT     MRI Spine with contrast     Elevated Blood Pressure   Continue with Lisinopril     Ileus CT abdomen neg   Advance diet as tolerated     Plan of care discussed with patient 
    49 M with no medical problems p/w Intractable Back Pain       CT Lumbar spine shows   No evidence of acute osseous fracture or shekhar dislocation.  2.  Degenerative changes in the lower lumbar spine.  3.  Sacralization of the left L5 hemivertebra    MRI Lumbar spine shows Nonspecific increased signal is noted within the interspinous ligament at   L4-L5 and L5-S1 which may reflect ligamentous sprain, posttraumatic or   degenerative change. Disc bulging at L4-L5 contributes to moderate left foraminal narrowing.    Acute  Intractable Back pain   Neuro sx consulted   Ortho consulted   Recommend Decadron   Add Tylenol standing dose, Flexeril    Bowel Regimen   Pain meds prn   Pain Management consulted     Continue lyrica 150 mg BID- current CrCl is 69.6.  Discontinue IV dilaudid 1 mg.  Start PO dilaudid 4 mg Q 4 hours PRN.  Continue IV dilaudid 2 mg Q 6 hours PRN severe breakthrough pain.  Discontinue flexeril and start zanaflex 4 mg Q 6 hours.  PT     Elevated Blood Pressure   Continue with Lisinopril     Ileus CT abdomen neg   Advance diet as tolerated     Plan of care discussed with patient 
    49 M with no medical problems p/w Intractable Back Pain       CT Lumbar spine shows   No evidence of acute osseous fracture or shekhar dislocation.  2.  Degenerative changes in the lower lumbar spine.  3.  Sacralization of the left L5 hemivertebra    Acute  Intractable Back pain   Neuro sx consulted   MRI done results pending   Pain meds prn     PT     Elevated Blood Pressure     Start patient  on Lisinopril   
    49 M with no medical problems p/w Intractable Back Pain       CT Lumbar spine shows   No evidence of acute osseous fracture or shekhar dislocation.  2.  Degenerative changes in the lower lumbar spine.  3.  Sacralization of the left L5 hemivertebra    MRI Lumbar spine shows Nonspecific increased signal is noted within the interspinous ligament at   L4-L5 and L5-S1 which may reflect ligamentous sprain, posttraumatic or   degenerative change. Disc bulging at L4-L5 contributes to moderate left foraminal narrowing.    Acute  Intractable Back pain   Neuro sx consulted   Ortho consulted   Recommend Decadron   Add Tylenol standing dose, Flexeril    Bowel Regimen   Pain meds prn     PT     Elevated Blood Pressure   Continue with Lisinopril     Ileus  Keep patient NPO   Follow up CT abdomen     Plan of care discussed with patient 
    49 M with no medical problems p/w Intractable Back Pain       CT Lumbar spine shows   No evidence of acute osseous fracture or shekhar dislocation.  2.  Degenerative changes in the lower lumbar spine.  3.  Sacralization of the left L5 hemivertebra    MRI Lumbar spine shows Nonspecific increased signal is noted within the interspinous ligament at   L4-L5 and L5-S1 which may reflect ligamentous sprain, posttraumatic or   degenerative change. Disc bulging at L4-L5 contributes to moderate left foraminal narrowing.    Acute  Intractable Back pain   Neuro sx consulted   Ortho consulted   Recommend Decadron   Bowel Regimen   Pain meds prn     PT     Elevated Blood Pressure   Continue with Lisinopril     Plan of care discussed with patient and his wife Bed side

## 2024-10-31 NOTE — PROVIDER CONTACT NOTE (OTHER) - ACTION/TREATMENT ORDERED:
As per ANA Jacobo, continue to monitor BP and continue pain management. No further interventions.
MD Marcell Padilla aware. Will re-check BP in one hour. Nursing care ongoing.
MD states he just assessed Pt and adjusted pain regimen, ordered dilaudid IV push 0.5mg, recheck BP in an hour.
Provider notified. No further orders at this time.
continue to monitor, no new interventions at this time, nursing care ongoing.
IV morphine given as ordered, MD made aware, states he will assess Pt at bedside.
per NP give pain meds now and rechecked bp after medicating pt. will continue to monitor pt closely
Provider made aware. NP to bedside to assess patient. No interventions at this time. Will continue to monitor.
vijay Miller aware. pending attending recommendations. pt was on ileus precaution on 10/30. will continue to monitor
Provider made aware. As per NP, EKG and LR 1000ml bolus ordered. Lisinopril 10mg ordered to be administered as well. Will continue to monitor.
Leigh Ann Olsen NP notified. Give stat lisinopril and new ordered standing medications aleve, robaxin, lyrica. No further orders @ this time.

## 2024-10-31 NOTE — PROVIDER CONTACT NOTE (OTHER) - SITUATION
Pt BP recheck 162/105 SC 71
Pt /102
/121
Pt /117
HR in 120-130's
Pt's /96. Pt denies any chest pain and SOB
bp 171/111. family c/o of pain management
/123
HR in 120's and BP of 156/101
s/p  low back pain
/113

## 2024-10-31 NOTE — PROVIDER CONTACT NOTE (OTHER) - NAME OF MD/NP/PA/DO NOTIFIED:
Marcell Padilla MD
Danis Mendez NP
JESSICA Mack
Marcell Padilla MD
Dulce Garland
Danis Mendez NP
ANA Jacobo
MD Marcell Padilla
Pretty, Leigh Ann LOPEZ
vijay cui, PA
Marcell Padilla MD

## 2024-10-31 NOTE — PROGRESS NOTE ADULT - SUBJECTIVE AND OBJECTIVE BOX
Patient is a 49y old  Male who presents with a chief complaint of Back pain (27 Oct 2024 12:35)      SUBJECTIVE / OVERNIGHT EVENTS: No events     T(C): 36.9 (10-30-24 @ 15:59), Max: 36.9 (10-30-24 @ 15:59)  HR: 86 (10-30-24 @ 15:59) (72 - 86)  BP: 119/78 (10-30-24 @ 15:59) (93/60 - 119/78)  RR: 18 (10-30-24 @ 15:59) (18 - 18)  SpO2: 97% (10-30-24 @ 15:59) (97% - 99%)      MEDICATIONS  (STANDING):  acetaminophen     Tablet .. 650 milliGRAM(s) Oral every 6 hours  bisacodyl 5 milliGRAM(s) Oral at bedtime  cyanocobalamin 1000 MICROGram(s) Oral daily  heparin   Injectable 5000 Unit(s) SubCutaneous every 8 hours  influenza   Vaccine 0.5 milliLiter(s) IntraMuscular once  lidocaine   4% Patch 1 Patch Transdermal every 24 hours  metoprolol tartrate 12.5 milliGRAM(s) Oral two times a day  polyethylene glycol 3350 17 Gram(s) Oral two times a day  pregabalin 150 milliGRAM(s) Oral two times a day  senna 2 Tablet(s) Oral at bedtime  tiZANidine 4 milliGRAM(s) Oral every 6 hours    MEDICATIONS  (PRN):  acetaminophen     Tablet .. 975 milliGRAM(s) Oral every 6 hours PRN Temp greater or equal to 38C (100.4F), Mild Pain (1 - 3)  HYDROmorphone   Tablet 4 milliGRAM(s) Oral every 4 hours PRN Severe Pain (7 - 10)            PHYSICAL EXAM:  GENERAL: NAD, well-developed  HEAD:  Atraumatic, Normocephalic  EYES: EOMI, conjunctiva and sclera clear  NECK: Supple, No JVD  CHEST/LUNG: Clear to auscultation bilaterally; No wheeze  HEART: Regular rate and rhythm; No murmurs, rubs, or gallops  ABDOMEN: Soft, Nontender, Nondistended; Bowel sounds present  EXTREMITIES:  ROM limited   2+ Peripheral Pulses, No clubbing, cyanosis, or edema  PSYCH: AAOx3  NEUROLOGY: non-focal  SKIN: No rashes or lesions                     CARDIAC MARKERS ( 29 Oct 2024 07:30 )  x     / x     / x     / x     / 2.1 ng/mL    
Patient is a 49y old  Male who presents with a chief complaint of Back pain (27 Oct 2024 12:35)      SUBJECTIVE / OVERNIGHT EVENTS: Patient reports back pain 6/10 manageable with po Dilaudid   Patient s wife Bed side       T(C): 36.7 (10-29-24 @ 16:35), Max: 36.7 (10-29-24 @ 16:35)  HR: 99 (10-29-24 @ 16:35) (77 - 99)  BP: 155/90 (10-29-24 @ 16:35) (116/72 - 155/90)  RR: 18 (10-29-24 @ 16:35) (18 - 18)  SpO2: 100% (10-29-24 @ 16:35) (98% - 100%)      MEDICATIONS  (STANDING):  acetaminophen     Tablet .. 650 milliGRAM(s) Oral every 6 hours  bisacodyl 5 milliGRAM(s) Oral at bedtime  cyanocobalamin 1000 MICROGram(s) Oral daily  heparin   Injectable 5000 Unit(s) SubCutaneous every 8 hours  influenza   Vaccine 0.5 milliLiter(s) IntraMuscular once  lidocaine   4% Patch 1 Patch Transdermal every 24 hours  metoprolol tartrate 12.5 milliGRAM(s) Oral two times a day  polyethylene glycol 3350 17 Gram(s) Oral daily  pregabalin 150 milliGRAM(s) Oral two times a day  senna 2 Tablet(s) Oral at bedtime  tiZANidine 4 milliGRAM(s) Oral every 6 hours    MEDICATIONS  (PRN):  acetaminophen     Tablet .. 975 milliGRAM(s) Oral every 6 hours PRN Temp greater or equal to 38C (100.4F), Mild Pain (1 - 3)  HYDROmorphone   Tablet 4 milliGRAM(s) Oral every 4 hours PRN Severe Pain (7 - 10)  HYDROmorphone  Injectable 2 milliGRAM(s) IV Push every 6 hours PRN severe breakthrough pain    MEDICATIONS  (PRN):  acetaminophen     Tablet .. 975 milliGRAM(s) Oral every 6 hours PRN Temp greater or equal to 38C (100.4F), Mild Pain (1 - 3)  HYDROmorphone  Injectable 2 milliGRAM(s) IV Push every 6 hours PRN Severe Pain (7 - 10)  HYDROmorphone  Injectable 1 milliGRAM(s) IV Push every 3 hours PRN Moderate Pain (4 - 6)  ketorolac   Injectable 30 milliGRAM(s) IV Push every 8 hours PRN Moderate Pain (4 - 6)  morphine  - Injectable 2 milliGRAM(s) IV Push every 6 hours PRN Severe Pain (7 - 10)  PHYSICAL EXAM:  GENERAL: NAD, well-developed  HEAD:  Atraumatic, Normocephalic  EYES: EOMI, PERRLA, conjunctiva and sclera clear  NECK: Supple, No JVD  CHEST/LUNG: Clear to auscultation bilaterally; No wheeze  HEART: Regular rate and rhythm; No murmurs, rubs, or gallops  ABDOMEN: Soft, Nontender, Nondistended; Bowel sounds present  EXTREMITIES:  ROM limited   2+ Peripheral Pulses, No clubbing, cyanosis, or edema  PSYCH: AAOx3  NEUROLOGY: non-focal  SKIN: No rashes or lesions                                  14.5   7.25  )-----------( 257      ( 28 Oct 2024 09:08 )             46.8       CARDIAC MARKERS ( 29 Oct 2024 07:30 )  x     / x     / x     / x     / 2.1 ng/mL    
Patient is a 49y old  Male who presents with a chief complaint of Back pain (27 Oct 2024 12:35)      SUBJECTIVE / OVERNIGHT EVENTS: Patient reports back pain 6/10 manageable with po Dilaudid   Patient s wife Bed side       T(C): 36.7 (10-28-24 @ 12:18), Max: 37.1 (10-28-24 @ 04:12)  HR: 84 (10-28-24 @ 13:52) (75 - 98)  BP: 153/98 (10-28-24 @ 13:52) (150/83 - 164/85)  RR: 18 (10-28-24 @ 12:18) (18 - 18)  SpO2: 98% (10-28-24 @ 12:18) (98% - 99%)        MEDICATIONS  (STANDING):  cyanocobalamin 1000 MICROGram(s) Oral daily  dexAMETHasone  IVPB 10 milliGRAM(s) IV Intermittent every 6 hours  heparin   Injectable 5000 Unit(s) SubCutaneous every 8 hours  influenza   Vaccine 0.5 milliLiter(s) IntraMuscular once  lidocaine   4% Patch 1 Patch Transdermal every 24 hours  lisinopril 5 milliGRAM(s) Oral daily  methocarbamol 750 milliGRAM(s) Oral two times a day  polyethylene glycol 3350 17 Gram(s) Oral daily  pregabalin 150 milliGRAM(s) Oral two times a day  senna 2 Tablet(s) Oral at bedtime    MEDICATIONS  (PRN):  acetaminophen     Tablet .. 975 milliGRAM(s) Oral every 6 hours PRN Temp greater or equal to 38C (100.4F), Mild Pain (1 - 3)  HYDROmorphone  Injectable 2 milliGRAM(s) IV Push every 6 hours PRN Severe Pain (7 - 10)  HYDROmorphone  Injectable 1 milliGRAM(s) IV Push every 3 hours PRN Moderate Pain (4 - 6)  ketorolac   Injectable 30 milliGRAM(s) IV Push every 8 hours PRN Moderate Pain (4 - 6)  morphine  - Injectable 2 milliGRAM(s) IV Push every 6 hours PRN Severe Pain (7 - 10)  PHYSICAL EXAM:  GENERAL: NAD, well-developed  HEAD:  Atraumatic, Normocephalic  EYES: EOMI, PERRLA, conjunctiva and sclera clear  NECK: Supple, No JVD  CHEST/LUNG: Clear to auscultation bilaterally; No wheeze  HEART: Regular rate and rhythm; No murmurs, rubs, or gallops  ABDOMEN: Soft, Nontender, Nondistended; Bowel sounds present  EXTREMITIES:  ROM limited   2+ Peripheral Pulses, No clubbing, cyanosis, or edema  PSYCH: AAOx3  NEUROLOGY: non-focal  SKIN: No rashes or lesions                          14.5   7.25  )-----------( 257      ( 28 Oct 2024 09:08 )             46.8             Urinalysis Basic - ( 26 Oct 2024 15:09 )    Color: Yellow / Appearance: Clear / S.010 / pH: x  Gluc: x / Ketone: Negative mg/dL  / Bili: Negative / Urobili: 0.2 mg/dL   Blood: x / Protein: Negative mg/dL / Nitrite: Negative   Leuk Esterase: Negative / RBC: 0 /HPF / WBC 0 /HPF   Sq Epi: x / Non Sq Epi: 0 /HPF / Bacteria: Negative /HPF        RADIOLOGY & ADDITIONAL TESTS:    Imaging Personally Reviewed:    Consultant(s) Notes Reviewed:      Care Discussed with Consultants/Other Providers:  
Patient is a 49y old  Male who presents with a chief complaint of Back pain (27 Oct 2024 12:35)      SUBJECTIVE / OVERNIGHT EVENTS: no events   Patient s wife Bed side     MEDICATIONS  (STANDING):  cyanocobalamin 1000 MICROGram(s) Oral daily  heparin   Injectable 5000 Unit(s) SubCutaneous every 8 hours  lidocaine   4% Patch 1 Patch Transdermal every 24 hours  lisinopril 5 milliGRAM(s) Oral daily  methocarbamol 750 milliGRAM(s) Oral two times a day  polyethylene glycol 3350 17 Gram(s) Oral daily  pregabalin 150 milliGRAM(s) Oral two times a day  senna 2 Tablet(s) Oral at bedtime    MEDICATIONS  (PRN):  acetaminophen     Tablet .. 975 milliGRAM(s) Oral every 6 hours PRN Temp greater or equal to 38C (100.4F), Mild Pain (1 - 3)  HYDROmorphone  Injectable 2 milliGRAM(s) IV Push every 6 hours PRN Severe Pain (7 - 10)  HYDROmorphone  Injectable 1 milliGRAM(s) IV Push every 3 hours PRN Moderate Pain (4 - 6)  ketorolac   Injectable 30 milliGRAM(s) IV Push every 8 hours PRN Moderate Pain (4 - 6)  morphine  - Injectable 2 milliGRAM(s) IV Push every 6 hours PRN Severe Pain (7 - 10)      CAPILLARY BLOOD GLUCOSE        I&O's Summary    27 Oct 2024 07:01  -  27 Oct 2024 19:53  --------------------------------------------------------  IN: 360 mL / OUT: 0 mL / NET: 360 mL      T(C): 37 (10-27-24 @ 17:15), Max: 37 (10-27-24 @ 09:24)  HR: 90 (10-27-24 @ 17:15) (71 - 93)  BP: 163/113 (10-27-24 @ 17:15) (146/102 - 187/121)  RR: 18 (10-27-24 @ 17:15) (18 - 18)  SpO2: 99% (10-27-24 @ 17:15) (96% - 99%)    PHYSICAL EXAM:  GENERAL: NAD, well-developed  HEAD:  Atraumatic, Normocephalic  EYES: EOMI, PERRLA, conjunctiva and sclera clear  NECK: Supple, No JVD  CHEST/LUNG: Clear to auscultation bilaterally; No wheeze  HEART: Regular rate and rhythm; No murmurs, rubs, or gallops  ABDOMEN: Soft, Nontender, Nondistended; Bowel sounds present  EXTREMITIES:  ROM limited   2+ Peripheral Pulses, No clubbing, cyanosis, or edema  PSYCH: AAOx3  NEUROLOGY: non-focal  SKIN: No rashes or lesions    LABS:                        13.6   8.63  )-----------( 244      ( 26 Oct 2024 13:49 )             42.7     10-26    138  |  103  |  14  ----------------------------<  96  4.3   |  22  |  1.13    Ca    9.5      26 Oct 2024 13:49    TPro  7.4  /  Alb  4.6  /  TBili  0.4  /  DBili  x   /  AST  21  /  ALT  40  /  AlkPhos  65  10-26          Urinalysis Basic - ( 26 Oct 2024 15:09 )    Color: Yellow / Appearance: Clear / S.010 / pH: x  Gluc: x / Ketone: Negative mg/dL  / Bili: Negative / Urobili: 0.2 mg/dL   Blood: x / Protein: Negative mg/dL / Nitrite: Negative   Leuk Esterase: Negative / RBC: 0 /HPF / WBC 0 /HPF   Sq Epi: x / Non Sq Epi: 0 /HPF / Bacteria: Negative /HPF        RADIOLOGY & ADDITIONAL TESTS:    Imaging Personally Reviewed:    Consultant(s) Notes Reviewed:      Care Discussed with Consultants/Other Providers:  
Stable, states pain this morning was 2/3 out of 10. No red flag symptoms    Exam:   Alert/Oriented, No Acute Distress           Sensation: [X] intact to light touch  [ ] decreased:          Motor exam: [ X ]          [X] Upper extremity                Bi          Tri        Delt                                                    R         5/5        5/5        5/5                                               L          5/5        5/5        5/5                  [X] Lower extremeity            PF          DF         EHL       FHL                                                                                            R        5/5        5/5        5/5       5/5                                                        L         5/5        5/5        5/5       5/5                                                                  Calves Soft/Non-tender bilaterally           [X] warm well perfused; capillary refill <3 seconds              LABS:              RADIOLOGY & ADDITIONAL STUDIES:      A/P :  49y Male with moderate L4-L5 foraminal left sided stenosis. No weakness. No bowel/bladder control problems. Discussed various treatment options including surgical intervention. Discussed risks/benefits of any procedure. Discussed that surgery would be to turn down the volume of his symptoms 50-60% and I could not guarantee 100% symptoms resolution with a decompression surgery. I also discussed the risk of further surgery. The patient and family will consider their options and let us know how they would like to proceed. 
Patient is a 49y old  Male who presents with a chief complaint of Back pain (27 Oct 2024 12:35)      SUBJECTIVE / OVERNIGHT EVENTS: No events     T(C): 36.7 (10-31-24 @ 16:21), Max: 37 (10-31-24 @ 11:00)  HR: 69 (10-31-24 @ 16:21) (69 - 73)  BP: 134/80 (10-31-24 @ 16:21) (134/80 - 171/111)  RR: 18 (10-31-24 @ 16:21) (18 - 18)  SpO2: 100% (10-31-24 @ 16:21) (99% - 100%)      MEDICATIONS  (STANDING):  acetaminophen     Tablet .. 650 milliGRAM(s) Oral every 6 hours  bisacodyl 5 milliGRAM(s) Oral at bedtime  cyanocobalamin 1000 MICROGram(s) Oral daily  heparin   Injectable 5000 Unit(s) SubCutaneous every 8 hours  influenza   Vaccine 0.5 milliLiter(s) IntraMuscular once  lidocaine   4% Patch 1 Patch Transdermal every 24 hours  metoprolol tartrate 12.5 milliGRAM(s) Oral two times a day  oxyCODONE    IR 10 milliGRAM(s) Oral once  polyethylene glycol 3350 17 Gram(s) Oral two times a day  pregabalin 150 milliGRAM(s) Oral two times a day  senna 2 Tablet(s) Oral at bedtime  tiZANidine 4 milliGRAM(s) Oral every 6 hours    MEDICATIONS  (PRN):  acetaminophen     Tablet .. 975 milliGRAM(s) Oral every 6 hours PRN Temp greater or equal to 38C (100.4F), Mild Pain (1 - 3)  HYDROmorphone   Tablet 4 milliGRAM(s) Oral every 4 hours PRN Severe Pain (7 - 10)  HYDROmorphone  Injectable 1 milliGRAM(s) IV Push every 6 hours PRN severe breakthrough pain          PHYSICAL EXAM:  GENERAL: NAD, well-developed  HEAD:  Atraumatic, Normocephalic  EYES: EOMI, conjunctiva and sclera clear  NECK: Supple, No JVD  CHEST/LUNG: Clear to auscultation bilaterally; No wheeze  HEART: Regular rate and rhythm; No murmurs, rubs, or gallops  ABDOMEN: Soft, Nontender, Nondistended; Bowel sounds present  EXTREMITIES:  ROM limited   2+ Peripheral Pulses, No clubbing, cyanosis, or edema  PSYCH: AAOx3  NEUROLOGY: non-focal  SKIN: No rashes or lesions                 no new labs 
Patient seen and examined at bedside.    --Anticoagulation--  heparin   Injectable 5000 Unit(s) SubCutaneous every 8 hours    T(C): 37 (10-27-24 @ 09:25), Max: 37.3 (10-26-24 @ 19:55)  HR: 93 (10-27-24 @ 09:25) (71 - 93)  BP: 155/93 (10-27-24 @ 09:25) (146/102 - 196/29)  RR: 18 (10-27-24 @ 09:25) (18 - 20)  SpO2: 96% (10-27-24 @ 09:25) (96% - 99%)  Wt(kg): --    Exam:  AOx3, BUE 5/5, RLE 5/5, LLE HF 4/5 pain limited, KF/KE 5/5, DF/PF 5/5.

## 2024-10-31 NOTE — PROVIDER CONTACT NOTE (OTHER) - ASSESSMENT
Pt A&Ox4, VSS except tachycardic in the 130's. Pt denies chest pain or SOB. Denies pain in back at this time. Resting comfortably in bed with family at bedside.
pt walked to bathroom causing him to be in pain.
Pt A+O x4. BP elevated, vitals otherwise stable on RA. Pt denies any SOB and chest pain
Pt /102, all other VSS. A&Ox4. Pt denies chest pain or SOB. Denies headache. C/o pain 6/10, IVP toradol given as ord.
Pt A&Ox4, /105 VS otherwise stable. No c/o headache,  chest pain, SOB or dizziness. Pt family asking for EKG for BP. Pt continue to c/o pain.
Pt A&Ox4, Pt /117, VS otherwise stable. No c/o headache, chest pain, dizziness or SOB. Pt c/o 10/10 pain. Pt recently moved from stretcher to bed. Pt highest BP recorded in L arm 196/129. Manual /100.
Pt A&Ox4, VSS except hypertensive and tachycardic in the 120's. Pt denies any chest pain or SOB. Denies any pain at this time. Resting comfortably in bed.
Pt A&Ox4, VSS except /121. Pt denies chest pain or SOB at this time. Pt in severe back pain radiating down L leg. MD made aware, safety measures intact, nursing care ongoing.
Pt A&Ox4, VSS except /113 upon arriving to 7T after MRI. Pt resting comfortably in bed and eating dinner. Pt denies any SOB or chest pain at this time. Safety measures intact, nursing care ongoing.
Pt A&Ox4, VSS except /123, pupils equal + reactive to light, Pt c/o pain from L lower back radiating down to leg; but denies any chest pain or SOB at this time. MD made aware, wife at the bedside, safety intact, nursing care ongoing.
pt bp elevated- spouse refused bp meds in AM. pt had BM in AM.

## 2024-10-31 NOTE — PROVIDER CONTACT NOTE (OTHER) - DATE AND TIME:
27-Oct-2024 08:39
28-Oct-2024 23:56
31-Oct-2024 11:18
27-Oct-2024 20:23
27-Oct-2024 15:45
27-Oct-2024 17:22
28-Oct-2024 09:09
28-Oct-2024 21:35
26-Oct-2024 20:40
26-Oct-2024 22:41
27-Oct-2024 13:45

## 2024-10-31 NOTE — PROVIDER CONTACT NOTE (OTHER) - REASON
bp 171/111
/121
Pt /117
/106
HR in 120's and BP of 156/101
HR in 120-130's
Pt's /96. Pt denies any chest pain and SOB
Pt /102
Pt BP recheck 162/105 MS 71
/113
/123

## 2024-11-01 VITALS
DIASTOLIC BLOOD PRESSURE: 80 MMHG | OXYGEN SATURATION: 98 % | TEMPERATURE: 99 F | RESPIRATION RATE: 18 BRPM | HEART RATE: 79 BPM | SYSTOLIC BLOOD PRESSURE: 132 MMHG

## 2024-11-01 LAB
ANION GAP SERPL CALC-SCNC: 13 MMOL/L — SIGNIFICANT CHANGE UP (ref 5–17)
BUN SERPL-MCNC: 19 MG/DL — SIGNIFICANT CHANGE UP (ref 7–23)
CALCIUM SERPL-MCNC: 9.9 MG/DL — SIGNIFICANT CHANGE UP (ref 8.4–10.5)
CHLORIDE SERPL-SCNC: 98 MMOL/L — SIGNIFICANT CHANGE UP (ref 96–108)
CK SERPL-CCNC: 58 U/L — SIGNIFICANT CHANGE UP (ref 30–200)
CO2 SERPL-SCNC: 29 MMOL/L — SIGNIFICANT CHANGE UP (ref 22–31)
CREAT SERPL-MCNC: 1.21 MG/DL — SIGNIFICANT CHANGE UP (ref 0.5–1.3)
CRP SERPL-MCNC: <3 MG/L — SIGNIFICANT CHANGE UP (ref 0–4)
EGFR: 74 ML/MIN/1.73M2 — SIGNIFICANT CHANGE UP
ERYTHROCYTE [SEDIMENTATION RATE] IN BLOOD: 8 MM/HR — SIGNIFICANT CHANGE UP (ref 0–15)
GLUCOSE SERPL-MCNC: 112 MG/DL — HIGH (ref 70–99)
HCT VFR BLD CALC: 47.6 % — SIGNIFICANT CHANGE UP (ref 39–50)
HGB BLD-MCNC: 14.8 G/DL — SIGNIFICANT CHANGE UP (ref 13–17)
MCHC RBC-ENTMCNC: 25 PG — LOW (ref 27–34)
MCHC RBC-ENTMCNC: 31.1 G/DL — LOW (ref 32–36)
MCV RBC AUTO: 80.5 FL — SIGNIFICANT CHANGE UP (ref 80–100)
NRBC # BLD: 0 /100 WBCS — SIGNIFICANT CHANGE UP (ref 0–0)
PLATELET # BLD AUTO: 271 K/UL — SIGNIFICANT CHANGE UP (ref 150–400)
POTASSIUM SERPL-MCNC: 4.4 MMOL/L — SIGNIFICANT CHANGE UP (ref 3.5–5.3)
POTASSIUM SERPL-SCNC: 4.4 MMOL/L — SIGNIFICANT CHANGE UP (ref 3.5–5.3)
RBC # BLD: 5.91 M/UL — HIGH (ref 4.2–5.8)
RBC # FLD: 12.7 % — SIGNIFICANT CHANGE UP (ref 10.3–14.5)
SODIUM SERPL-SCNC: 140 MMOL/L — SIGNIFICANT CHANGE UP (ref 135–145)
WBC # BLD: 7.18 K/UL — SIGNIFICANT CHANGE UP (ref 3.8–10.5)
WBC # FLD AUTO: 7.18 K/UL — SIGNIFICANT CHANGE UP (ref 3.8–10.5)

## 2024-11-01 PROCEDURE — 82553 CREATINE MB FRACTION: CPT

## 2024-11-01 PROCEDURE — 96375 TX/PRO/DX INJ NEW DRUG ADDON: CPT

## 2024-11-01 PROCEDURE — 80048 BASIC METABOLIC PNL TOTAL CA: CPT

## 2024-11-01 PROCEDURE — 72157 MRI CHEST SPINE W/O & W/DYE: CPT | Mod: MC

## 2024-11-01 PROCEDURE — 83735 ASSAY OF MAGNESIUM: CPT

## 2024-11-01 PROCEDURE — 96374 THER/PROPH/DIAG INJ IV PUSH: CPT

## 2024-11-01 PROCEDURE — 93970 EXTREMITY STUDY: CPT

## 2024-11-01 PROCEDURE — 85027 COMPLETE CBC AUTOMATED: CPT

## 2024-11-01 PROCEDURE — 85730 THROMBOPLASTIN TIME PARTIAL: CPT

## 2024-11-01 PROCEDURE — 85379 FIBRIN DEGRADATION QUANT: CPT

## 2024-11-01 PROCEDURE — 72131 CT LUMBAR SPINE W/O DYE: CPT | Mod: MC

## 2024-11-01 PROCEDURE — 93005 ELECTROCARDIOGRAM TRACING: CPT

## 2024-11-01 PROCEDURE — 74177 CT ABD & PELVIS W/CONTRAST: CPT | Mod: MC

## 2024-11-01 PROCEDURE — 97112 NEUROMUSCULAR REEDUCATION: CPT

## 2024-11-01 PROCEDURE — 93321 DOPPLER ECHO F-UP/LMTD STD: CPT

## 2024-11-01 PROCEDURE — 85025 COMPLETE CBC W/AUTO DIFF WBC: CPT

## 2024-11-01 PROCEDURE — A9585: CPT

## 2024-11-01 PROCEDURE — 80053 COMPREHEN METABOLIC PANEL: CPT

## 2024-11-01 PROCEDURE — 84484 ASSAY OF TROPONIN QUANT: CPT

## 2024-11-01 PROCEDURE — 97161 PT EVAL LOW COMPLEX 20 MIN: CPT

## 2024-11-01 PROCEDURE — 86038 ANTINUCLEAR ANTIBODIES: CPT

## 2024-11-01 PROCEDURE — 72156 MRI NECK SPINE W/O & W/DYE: CPT | Mod: MC

## 2024-11-01 PROCEDURE — 36415 COLL VENOUS BLD VENIPUNCTURE: CPT

## 2024-11-01 PROCEDURE — 86140 C-REACTIVE PROTEIN: CPT

## 2024-11-01 PROCEDURE — 72100 X-RAY EXAM L-S SPINE 2/3 VWS: CPT

## 2024-11-01 PROCEDURE — 72148 MRI LUMBAR SPINE W/O DYE: CPT | Mod: MC

## 2024-11-01 PROCEDURE — 93308 TTE F-UP OR LMTD: CPT

## 2024-11-01 PROCEDURE — 81001 URINALYSIS AUTO W/SCOPE: CPT

## 2024-11-01 PROCEDURE — 99285 EMERGENCY DEPT VISIT HI MDM: CPT

## 2024-11-01 PROCEDURE — 72158 MRI LUMBAR SPINE W/O & W/DYE: CPT | Mod: MC

## 2024-11-01 PROCEDURE — 84100 ASSAY OF PHOSPHORUS: CPT

## 2024-11-01 PROCEDURE — 82550 ASSAY OF CK (CPK): CPT

## 2024-11-01 PROCEDURE — 97116 GAIT TRAINING THERAPY: CPT

## 2024-11-01 PROCEDURE — 74018 RADEX ABDOMEN 1 VIEW: CPT

## 2024-11-01 PROCEDURE — 85652 RBC SED RATE AUTOMATED: CPT

## 2024-11-01 PROCEDURE — 93325 DOPPLER ECHO COLOR FLOW MAPG: CPT

## 2024-11-01 RX ORDER — METOPROLOL TARTRATE 50 MG
0.5 TABLET ORAL
Qty: 30 | Refills: 0
Start: 2024-11-01 | End: 2024-11-30

## 2024-11-01 RX ORDER — TIZANIDINE 2 MG/1
1 TABLET ORAL
Qty: 56 | Refills: 0
Start: 2024-11-01 | End: 2024-11-14

## 2024-11-01 RX ORDER — PANTOPRAZOLE SODIUM 40 MG/1
1 TABLET, DELAYED RELEASE ORAL
Qty: 14 | Refills: 0
Start: 2024-11-01 | End: 2024-11-14

## 2024-11-01 RX ORDER — DEXAMETHASONE 1.5 MG 1.5 MG/1
6 TABLET ORAL ONCE
Refills: 0 | Status: COMPLETED | OUTPATIENT
Start: 2024-11-01 | End: 2024-11-01

## 2024-11-01 RX ORDER — OXYCODONE HYDROCHLORIDE 30 MG/1
1 TABLET ORAL
Refills: 0 | DISCHARGE

## 2024-11-01 RX ORDER — PREGABALIN 150 MG/1
1 CAPSULE ORAL
Refills: 0 | DISCHARGE

## 2024-11-01 RX ORDER — HYDROMORPHONE HCL/0.9% NACL/PF 6 MG/30 ML
1 PATIENT CONTROLLED ANALGESIA SYRINGE INTRAVENOUS
Qty: 30 | Refills: 0
Start: 2024-11-01 | End: 2024-11-05

## 2024-11-01 RX ORDER — POLYETHYLENE GLYCOL 3350 17 G/17G
17 POWDER, FOR SOLUTION ORAL
Qty: 0 | Refills: 0 | DISCHARGE
Start: 2024-11-01

## 2024-11-01 RX ORDER — DICLOFENAC SODIUM AND BENZALKONIUM CHLORIDE
2 KIT
Qty: 0 | Refills: 0 | DISCHARGE

## 2024-11-01 RX ORDER — PREGABALIN 150 MG/1
1 CAPSULE ORAL
Qty: 0 | Refills: 0 | DISCHARGE
Start: 2024-11-01

## 2024-11-01 RX ORDER — NAPROXEN 250 MG/1
1 TABLET ORAL
Refills: 0 | DISCHARGE

## 2024-11-01 RX ORDER — DEXAMETHASONE 1.5 MG 1.5 MG/1
1 TABLET ORAL
Qty: 21 | Refills: 0
Start: 2024-11-01 | End: 2024-11-05

## 2024-11-01 RX ORDER — ACETAMINOPHEN 500 MG
2 TABLET ORAL
Qty: 0 | Refills: 0 | DISCHARGE
Start: 2024-11-01

## 2024-11-01 RX ORDER — DEXAMETHASONE 1.5 MG 1.5 MG/1
6 TABLET ORAL DAILY
Refills: 0 | Status: DISCONTINUED | OUTPATIENT
Start: 2024-11-01 | End: 2024-11-01

## 2024-11-01 RX ORDER — PREGABALIN 150 MG/1
1 CAPSULE ORAL
Qty: 10 | Refills: 0
Start: 2024-11-01 | End: 2024-11-05

## 2024-11-01 RX ORDER — SENNA 187 MG
2 TABLET ORAL
Qty: 0 | Refills: 0 | DISCHARGE
Start: 2024-11-01

## 2024-11-01 RX ORDER — METHOCARBAMOL 500 MG/1
1 TABLET ORAL
Refills: 0 | DISCHARGE

## 2024-11-01 RX ORDER — ACETAMINOPHEN 500 MG
2 TABLET ORAL
Refills: 0 | DISCHARGE

## 2024-11-01 RX ORDER — NALOXONE HYDROCHLORIDE 1 MG/ML
1 INJECTION, SOLUTION INTRAMUSCULAR; INTRAVENOUS; SUBCUTANEOUS
Qty: 1 | Refills: 0
Start: 2024-11-01 | End: 2024-11-01

## 2024-11-01 RX ADMIN — Medication 650 MILLIGRAM(S): at 05:47

## 2024-11-01 RX ADMIN — Medication 4 MILLIGRAM(S): at 11:31

## 2024-11-01 RX ADMIN — Medication 4 MILLIGRAM(S): at 03:35

## 2024-11-01 RX ADMIN — Medication 650 MILLIGRAM(S): at 00:50

## 2024-11-01 RX ADMIN — DEXAMETHASONE 1.5 MG 6 MILLIGRAM(S): 1.5 TABLET ORAL at 14:23

## 2024-11-01 RX ADMIN — TIZANIDINE 4 MILLIGRAM(S): 2 TABLET ORAL at 05:47

## 2024-11-01 RX ADMIN — PREGABALIN 150 MILLIGRAM(S): 150 CAPSULE ORAL at 05:48

## 2024-11-01 RX ADMIN — Medication 1000 MICROGRAM(S): at 11:31

## 2024-11-01 RX ADMIN — Medication 4 MILLIGRAM(S): at 04:25

## 2024-11-01 RX ADMIN — Medication 650 MILLIGRAM(S): at 07:35

## 2024-11-01 RX ADMIN — Medication 650 MILLIGRAM(S): at 11:31

## 2024-11-01 RX ADMIN — TIZANIDINE 4 MILLIGRAM(S): 2 TABLET ORAL at 11:31

## 2024-11-01 RX ADMIN — POLYETHYLENE GLYCOL 3350 17 GRAM(S): 17 POWDER, FOR SOLUTION ORAL at 05:48

## 2024-11-01 RX ADMIN — HEPARIN SODIUM 5000 UNIT(S): 10000 INJECTION INTRAVENOUS; SUBCUTANEOUS at 05:47

## 2024-11-01 RX ADMIN — Medication 12.5 MILLIGRAM(S): at 08:59

## 2024-11-01 RX ADMIN — PREGABALIN 150 MILLIGRAM(S): 150 CAPSULE ORAL at 16:30

## 2024-11-01 RX ADMIN — Medication 4 MILLIGRAM(S): at 16:08

## 2024-11-01 RX ADMIN — TIZANIDINE 4 MILLIGRAM(S): 2 TABLET ORAL at 16:30

## 2024-11-01 NOTE — DISCHARGE NOTE PROVIDER - PROVIDER TOKENS
PROVIDER:[TOKEN:[[id number]],FOLLOWUP:[2 weeks]],PROVIDER:[TOKEN:[[id number]],FOLLOWUP:[2 weeks],ESTABLISHEDPATIENT:[T]],PROVIDER:[TOKEN:[[id number]],FOLLOWUP:[2 weeks],ESTABLISHEDPATIENT:[T]],PROVIDER:[TOKEN:[[id number]],FOLLOWUP:[2 weeks],ESTABLISHEDPATIENT:[T]]

## 2024-11-01 NOTE — DISCHARGE NOTE PROVIDER - HOSPITAL COURSE
HPI:    Hospital Course: Patient admitted with Intractable Back Pain   > Acute  Intractable Back pain   Neuro sx consulted   Ortho consulted and recommended Decadron completed    Add Tylenol standing dose, Flexeril     Pain Management consulted   > CT Lumbar spine shows   No evidence of acute osseous fracture or shekhar dislocation.  2.  Degenerative changes in the lower lumbar spine.  3.  Sacralization of the left L5 hemivertebra    - MRI Lumbar spine shows Nonspecific increased signal is noted within the interspinous ligament at   L4-L5 and L5-S1 which may reflect ligamentous sprain, posttraumatic or   degenerative change. Disc bulging at L4-L5 contributes to moderate left foraminal narrowing.  Pain Regimen:   Continue lyrica 150 mg BID- current CrCl is 69.6.  Discontinue IV dilaudid 1 mg.  Start PO dilaudid 4 mg Q 4 hours PRN.  Continue IV dilaudid 2 mg Q 6 hours PRN severe breakthrough pain.  Discontinue flexeril and start zanaflex 4 mg Q 6 hours.  PT consulted- outpatient pt      > Hypertension  Continue with Lisinopril     > Ileus  - CT abdomen neg   Advance diet as tolerated           Important Medication Changes and Reason:    Active or Pending Issues Requiring Follow-up:    Advanced Directives:   [ ] Full code  [ ] DNR  [ ] Hospice    Discharge Diagnoses:  Intractable Back Pain        HPI:  50yo male with PMHx of Bulging L4-5 s/p Epidural one month ago by Dr. Reed presents to ED with recurrent worsening left low back pain after bending over yesterday. Reports he's had low back pain with radiating pain to LLE since 1.5yesra ago and f/ued with pain management Dr. Reed. He had PT and Robaxin, Lyrica and Meloxicam without pain relief. He had Epidural one month ago and noticed significant improvement. He felt recurrent sharp pain to low back with radiating LLE pain after bending over yesterday. He tool Oxycodone, Robaxin, and Naproxen without relief and he couldn't walk this morning. He also c/o LLE weakness today. Denies urinary or bowel problems. Denies sensory changes to genital area and extremities. Denies CP/SOB/ABD pain or N/V/D.    Hospital Course: Patient admitted with Intractable Back Pain. CT L-spine showed no evidence of acute osseous fracture or shekhar dislocation, notable for Degenerative changes in the lower lumbar spine and Sacralization of the left L5 hemivertebra. Neurosurgery and Ortho-Spine consulted. IV steroids initiated with slight improvement in pain. MR C/T/L-spine with and w/o IV contrast showed MRI Lumbar spine shows Nonspecific increased signal is noted within the interspinous ligament at L4-L5 and L5-S1 which may reflect ligamentous sprain, posttraumatic or degenerative changee, Disc bulging at L4-L5 contributes to moderate left foraminal narrowing. Pain Management service consulted; Pain controlled with tylenol, flexeril, morphine, dilaudid, tizanidine. Imaging review by Ortho-spine, no plan for inpatient spine intervention. PT evaluated patient and recommended outpatient PT with rolling walker. Discharge plan with medication reconciliation discussed with attending Dr. Marin. CT A/P obtained for concern for ileus, unremarkable. Patient is medically cleared for discharge home with outpatient follow up.        Important Medication Changes and Reason:        Active or Pending Issues Requiring Follow-up:  Bilateral sciatica- Orthopedics, Neurology ,Primary Care, Pain Management    Advanced Directives:   [ ] Full code  [ ] DNR  [ ] Hospice    Discharge Diagnoses:  Intractable Back Pain        HPI:  50yo male with PMHx of Bulging L4-5 s/p Epidural one month ago by Dr. Reed presents to ED with recurrent worsening left low back pain after bending over yesterday. Reports he's had low back pain with radiating pain to LLE since 1.5yesra ago and f/ued with pain management Dr. Reed. He had PT and Robaxin, Lyrica and Meloxicam without pain relief. He had Epidural one month ago and noticed significant improvement. He felt recurrent sharp pain to low back with radiating LLE pain after bending over yesterday. He tool Oxycodone, Robaxin, and Naproxen without relief and he couldn't walk this morning. He also c/o LLE weakness today. Denies urinary or bowel problems. Denies sensory changes to genital area and extremities. Denies CP/SOB/ABD pain or N/V/D.    Hospital Course: Patient admitted with Intractable Back Pain. CT L-spine showed no evidence of acute osseous fracture or shekhar dislocation, notable for Degenerative changes in the lower lumbar spine and Sacralization of the left L5 hemivertebra. Neurosurgery and Ortho-Spine consulted. IV steroids initiated with slight improvement in pain. MR C/T/L-spine with and w/o IV contrast showed MRI Lumbar spine shows Nonspecific increased signal is noted within the interspinous ligament at L4-L5 and L5-S1 which may reflect ligamentous sprain, posttraumatic or degenerative changes, Disc bulging at L4-L5 contributes to moderate left foraminal narrowing. Noted with HTN and tachycardia in the setting of severe pain, Lopressor initiated. Pain Management service consulted; Pain controlled with tylenol, flexeril, morphine, dilaudid, tizanidine. Imaging review by Ortho-spine, no plan for inpatient spine intervention. PT evaluated patient and recommended outpatient PT with rolling walker. Discharge plan with medication reconciliation discussed with attending Dr. Marin. CT A/P obtained for concern for ileus, unremarkable. Patient is medically cleared for discharge home with outpatient follow up.         Important Medication Changes and Reason:  Lopressor added for HTN  Tizanidine added for back pain/muscle spasm  Dilaudid for severe pain  Lyrica dose increased 75mg BID-->150mg BID  Dexamethasone oral taper added for sciatica back pain      Active or Pending Issues Requiring Follow-up:  Bilateral sciatica/low back pain- Neurology ,Primary Care, Pain Management, Ortho-Spine    Advanced Directives:   [x ] Full code  [ ] DNR  [ ] Hospice    Discharge Diagnoses:  Intractable Back Pain

## 2024-11-01 NOTE — DISCHARGE NOTE NURSING/CASE MANAGEMENT/SOCIAL WORK - FINANCIAL ASSISTANCE
[facility] provides services at a reduced cost to those who are determined to be eligible through [facility]’s financial assistance program. Information regarding [facility]’s financial assistance program can be found by going to [email or web address] or by calling [phone].

## 2024-11-01 NOTE — DISCHARGE NOTE PROVIDER - CARE PROVIDER_API CALL
César Cruz  Orthopaedic Surgery  [address], [address]  [place] [address]  Phone: [phone]  Fax: [phone]  Follow Up Time: 2 weeks    Jonathan Chan  Neurology  [address] [place] [address]  Phone: [phone]  Fax: [phone]  Established Patient  Follow Up Time: 2 weeks    Pilar Reed  Physical/Rehab Medicine  [address] [place] [address] [address]  [place] [address]  Phone: [phone]  Fax: [phone]  Established Patient  Follow Up Time: 2 weeks    Simon Bishop  Internal Medicine  [facility], [address]  [place] [address]  Phone: [phone]  Fax: [phone]  Established Patient  Follow Up Time: 2 weeks

## 2024-11-01 NOTE — DISCHARGE NOTE NURSING/CASE MANAGEMENT/SOCIAL WORK - NSDCPEFALRISK_GEN_ALL_CORE
For information on Fall & Injury Prevention, visit: [email or web address] OR  [email or web address] OR  https://www.cdc.gov/steadi/patient.html

## 2024-11-01 NOTE — CHART NOTE - NSCHARTNOTESELECT_GEN_ALL_CORE
Event Note
Neurosurgery S/O
Overnight Event Note/Event Note
chronic pain service/Event Note
Event Note

## 2024-11-01 NOTE — CHART NOTE - NSCHARTNOTEFT_GEN_A_CORE
To Whom So Ever Concern    This is to state that Chente Ann was admitted to [facility] from 10/26/24 to 11/01/24 for Acute Illness.  Given pending medical work up and treatment he can return to work on 11/11/24.      Regards,   Dr. Marin

## 2024-11-01 NOTE — CONSULT NOTE ADULT - ASSESSMENT
50yo male with PMHx of Bulging L4-5 s/p Epidural one month ago by Dr. Reed presents to ED with recurrent worsening left low back pain after bending over yesterday. Reports he's had low back pain with radiating pain to LLE since 1.5 years ago and f/ued with pain management Dr. Reed. He trialed PT and Robaxin, Lyrica and Meloxicam without pain relief. He had RUFUS one month ago and noticed significant improvement. He felt recurrent sharp pain to low back with radiating LLE pain after bending over yesterday. He tool Oxycodone, Robaxin, and Naproxen without relief and he couldn't walk this morning. He also c/o LLE weakness today.      Current out- patient pain regimen: lyrica 75 mg BID; oxy IR 5 mg QID PRN  Out Patient Pain Management provider: Pilar Reed MD  [facility] Prescription Monitoring Program: Reference #[id number]    Current Pain Score: 10/10    Pt with acute on chronic back pain.    Discussed further interventional pain management to include completing transforaminal RUFUS series and facet joint blocks.  Awaiting surgical consult with Dr Cruz.    Continue lyrica 150 mg BID- current CrCl is 69.6.  Discontinue IV dilaudid 1 mg.  Start PO dilaudid 4 mg Q 4 hours PRN.  Continue IV dilaudid 2 mg Q 6 hours PRN severe breakthrough pain.  Discontinue flexeril and start zanaflex 4 mg Q 6 hours.    Monitor for sedation and respiratory depression.  Bowel regimen.  Incentive spirometer.  OOB/ PT per primary team.    Discharge with a narcan rescue kit (naloxone 4 mg/ 0.1 ml nasal spray- 1 spray Q 2-3 minutes alternating between nostrils).  If continued pain management is needed after discharge, can see Dr Artis Baker [phone].      Minutes spent on total encounter: 60 minutes      Chronic Pain Service  [phone]    
48yo male with h/o LUmbar radiculopathy Bulging L4-5 s/p Epidural one month ago by Dr. Reed presents to ED with recurrent worsening left low back pain after bending over yesterday. Reports he's had low back pain with radiating pain to LLE since 1.5 years ago and f/ued with pain management Dr. Reed. He trialed PT and Robaxin, Lyrica and Meloxicam without pain relief. He had RUFUS one month ago and noticed significant improvement. He felt recurrent sharp pain to low back with radiating LLE pain after bending over yesterday. He tool Oxycodone, Robaxin, and Naproxen without relief and he couldn't walk this morning. He also c/o LLE weakness today.    CT L spine 10/26: sacralization of Left L5 hemivertebrae. degnerative changes   MRIL L spine 10/27: non specific signal at L4/5 and L5/s! c/w strain nor degnerative chagnes.  disc buldg L4/5 mod L foraminal narrowing   MRI C/T/L spine with adn w/o: C/T unremarkable most notable L4/S1  possibel straing vs degenerative changes   spoke with wife RN at bedside. states outpatient neuro is Luiza Chan.    has been working up muscle atrophy in buttock as well   no bowel/bladder incontienence   o/e AAOx3, 5/5 except LLE pain limited but stood today     Impression:   1) acute on chyronic LBP  2) lumbar radiculopathy /sciatica   3) LE numbness related to radiculopathy     - getting b12   - lidocaine patch   - lyrica 150mg BID   - tizanadine 4q6hrs  - seen by pain management.  patient getting dilauded PRN for pain as well  - f/u pain management   - seen by spine sx ; surgery discussed   - PT/OT   - NSAIDS as tolerated   - Counseling on diet, exercise, and medication adherence was done  - Counseling on smoking cessation and alcohol consumption offered when appropriate.  - Pain assessed and judicious use of narcotics when appropriate was discussed.    - Stroke education given when appropriate.  - Importance of fall prevention discussed.   - Differential diagnosis and plan of care discussed with patient and/or family and primary team  - Thank you for allowing me to participate in the care of this patient. Call with questions.   - f/u ortho spine and pain mangemenet.  if no plan for sx dc plannign with outpatient f/u with PT, spine, pain and his neurolgist dr luiza chan  spoke with patient and wife at bedside 11/1   Yo He MD  Vascular Neurology  Office: [phone] 
- Adm med for pain management/ PT  - Consider increasing pregabalin dose  - MR L spine  - Further plan pending MR L spine

## 2024-11-01 NOTE — DISCHARGE NOTE PROVIDER - NSDCCPCAREPLAN_GEN_ALL_CORE_FT
PRINCIPAL DISCHARGE DIAGNOSIS  Diagnosis: Low back pain with left-sided sciatica  Assessment and Plan of Treatment: Continue pain regimen      SECONDARY DISCHARGE DIAGNOSES  Diagnosis: Difficulty walking  Assessment and Plan of Treatment: DME as instructed     PRINCIPAL DISCHARGE DIAGNOSIS  Diagnosis: Low back pain with left-sided sciatica  Assessment and Plan of Treatment: Continue pain regimen      SECONDARY DISCHARGE DIAGNOSES  Diagnosis: Difficulty walking  Assessment and Plan of Treatment: Continue your recovery with outpatient PT. You are being discharged with a rolling walker, please use as directed.     PRINCIPAL DISCHARGE DIAGNOSIS  Diagnosis: Low back pain with left-sided sciatica  Assessment and Plan of Treatment: ESR, CRP, BIANCA, Creatine Kinase levels within normal limits. Continue pain regimen of Tylenol, Diluadid for severe pain, Tizanidine every 6 hours to control muscle spasms. You are being discharged on an oral steroid taper as follows: for Dexamethasone 2mg tablets:  Take 3 tablets by mouth every 8 hours tomorrow 11/02/24 then  Take 2 tablets by mouth every 12 hours on 11/03 and 11/04/24, then   Take 1 tablet by mouth every 12 hours on 11/05 and 11/06/24.  Take Pantoprazole for GI prophylaxis for a short course while you are taking oral steroids.  Follow up with Orthopedics-Spine Dr. Cruz, Neurology Dr. Jonathan Chan, Pain managment Dr. Reed, and Primary Care Dr. Bishop 1-2 weeks after discharge.      SECONDARY DISCHARGE DIAGNOSES  Diagnosis: Difficulty walking  Assessment and Plan of Treatment: Continue your recovery with outpatient PT. You are being discharged with a rolling walker, please use as directed.    Diagnosis: Hypertension  Assessment and Plan of Treatment: likely exacerbated by severe pain. You were started on a new medication called Metoprolol to help control your heart rate and blood pressure. Continue Metoprolol tartate (Lopressor) as prescribed.  Continue to follow a low salt/sodium diet.  Perform physical activities as tolerated in consultation with your Primary Care Provider and physical therapist.  Take all medications as prescribed.  Follow up with your medical doctor for routine blood pressure monitoring at your next visit.  Notify your doctor if you have any of the following symptoms:  Dizziness, lightheadedness, blurry vision, headache, chest pain, or shortness of breath.

## 2024-11-01 NOTE — DISCHARGE NOTE PROVIDER - CARE PROVIDERS DIRECT ADDRESSES
[email or web address],DirectAddress_Unknown,DirectAddress_Unknown,DirectAddress_Unknown

## 2024-11-01 NOTE — DISCHARGE NOTE NURSING/CASE MANAGEMENT/SOCIAL WORK - PATIENT PORTAL LINK FT
You can access the FollowMyHealth Patient Portal offered by [facility] by registering at the following website: [email or web address]. By joining [employer]’s FollowMyHealth portal, you will also be able to view your health information using other applications (apps) compatible with our system.

## 2024-11-01 NOTE — DISCHARGE NOTE PROVIDER - NSDCFUADDAPPT_GEN_ALL_CORE_FT
Follow up with outpatient PT and pain management APPTS ARE READY TO BE MADE: [x ] YES    Best Family or Patient Contact (if needed): Ruth Armstrong (spouse) [phone]    Additional Information about above appointments (if needed): all 1-2 weeks after discharge    1: Orthopedics-spine Dr Cruz  2: Pain Management Dr. Reed  3: Neurology Dr. Chan  4: Primary care    Other comments or requests:    APPTS ARE READY TO BE MADE: [x ] YES    Best Family or Patient Contact (if needed): Ruth Armstrong (spouse) [phone]    Additional Information about above appointments (if needed): all 1-2 weeks after discharge    1: Orthopedics-spine Dr Cruz  2: Pain Management Dr. Reed  3: Neurology Dr. Chan  4: Primary care    Other comments or requests:   Provided patient with provider referral information, however patient prefers to schedule the appointments on their own.

## 2024-11-01 NOTE — DISCHARGE NOTE PROVIDER - NSDCCPTREATMENT_GEN_ALL_CORE_FT
PRINCIPAL PROCEDURE  Procedure: MRI of cervical and lumbar spine without then with contrast  Findings and Treatment: performed 10/31/24  MRI CERVICAL SPINE:  1.  No evidence of critical narrowing of the spinal canal or abnormal   cord signal.  2.  Degenerative changes of the cervical spine.  MRI THORACIC SPINE:  1.  No evidence of critical narrowing of the spinal canal or abnormal   cord signal.  MRI LUMBAR SPINE:  1.  No evidence of critical narrowing of the spinal canal.  2.  Degenerative changes of the lumbar spine as discussed above.  3.  Redemonstrated faint increased T2/STIR signal about the interspinous   ligaments at L4-S1, mildly improved. This could be postoperative in   nature. A ligamentous sprain or degenerative change could present   similarly.

## 2024-11-01 NOTE — CHART NOTE - NSCHARTNOTEFT_GEN_A_CORE
50yo male with PMHx of Bulging L4-5 s/p Epidural one month ago by Dr. Reed presents to ED with recurrent worsening left low back pain after bending over yesterday. Reports he's had low back pain with radiating pain to LLE since 1.5 years ago and f/ued with pain management Dr. Reed. He trialed PT and Robaxin, Lyrica and Meloxicam without pain relief. He had RUFUS one month ago and noticed significant improvement. He felt recurrent sharp pain to low back with radiating LLE pain after bending over yesterday. He tool Oxycodone, Robaxin, and Naproxen without relief and he couldn't walk this morning. He also c/o LLE weakness today.      Current out- patient pain regimen: lyrica 75 mg BID; oxy IR 5 mg QID PRN  Out Patient Pain Management provider: Pilar Reed MD  [facility] Prescription Monitoring Program: Reference #[id number]    Current Pain Score: 9/10 down to 0/10  Pt with acute on chronic back pain.    < from: MR Lumbar Spine w/wo IV Cont (10.31.24 @ 14:40) >  IMPRESSION:    MRI CERVICAL SPINE:  1.  No evidence of critical narrowing of the spinal canal or abnormal   cord signal.  2.  Degenerative changes of the cervical spine.    MRI THORACIC SPINE:  1.  No evidence of critical narrowing of the spinal canal or abnormal   cord signal.    MRI LUMBAR SPINE:  1.  No evidence of critical narrowing of the spinal canal.  2.  Degenerative changes of the lumbar spine as discussed above.  3.  Redemonstrated faint increased T2/STIR signal about the interspinous   ligaments at L4-S1, mildly improved. This could be postoperative in   nature. A ligamentous sprain or degenerative change could present   similarly.    Continue lyrica 150 mg BID- current CrCl is 69.6.  Continue PO dilaudid 4 mg Q 4 hours PRN.  Continue zanaflex 4 mg Q 6 hours.  Bowel regimen PRN.     Pt to be discharged home today.  Continue above medications.  Discharge with a narcan rescue kit (naloxone 4 mg/ 0.1 ml nasal spray- 1 spray Q 2-3 minutes alternating between nostrils).  Follow up with Dr Pilar Reed for continued pain management in the community.    Signing off.      Chronic Pain Service  [phone]

## 2024-11-01 NOTE — CONSULT NOTE ADULT - SUBJECTIVE AND OBJECTIVE BOX
Neurology Consult    Reason for Consult: Patient is a 49y old  Male who presents with a chief complaint of Back pain (31 Oct 2024 16:54)      HPI: 48yo male with PMHx of Bulging L4-5 s/p Epidural one month ago by Dr. Reed presents to ED with recurrent worsening left low back pain after bending over yesterday. Reports he's had low back pain with radiating pain to LLE since 1.5 years ago and f/ued with pain management Dr. Reed. He trialed PT and Robaxin, Lyrica and Meloxicam without pain relief. He had RUFUS one month ago and noticed significant improvement. He felt recurrent sharp pain to low back with radiating LLE pain after bending over yesterday. He tool Oxycodone, Robaxin, and Naproxen without relief and he couldn't walk this morning. He also c/o LLE weakness today.         PAST MEDICAL & SURGICAL HISTORY:  No pertinent past medical history      No significant past surgical history          Allergies: Allergies    No Known Allergies    Intolerances        Social History: Denies toxic habits including tobacco, ETOH or illicit drugs.    Family History: FAMILY HISTORY:  . No family history of strokes    Medications: MEDICATIONS  (STANDING):  acetaminophen     Tablet .. 650 milliGRAM(s) Oral every 6 hours  bisacodyl 5 milliGRAM(s) Oral at bedtime  cyanocobalamin 1000 MICROGram(s) Oral daily  heparin   Injectable 5000 Unit(s) SubCutaneous every 8 hours  influenza   Vaccine 0.5 milliLiter(s) IntraMuscular once  lidocaine   4% Patch 1 Patch Transdermal every 24 hours  metoprolol tartrate 12.5 milliGRAM(s) Oral two times a day  polyethylene glycol 3350 17 Gram(s) Oral two times a day  pregabalin 150 milliGRAM(s) Oral two times a day  senna 2 Tablet(s) Oral at bedtime  tiZANidine 4 milliGRAM(s) Oral every 6 hours    MEDICATIONS  (PRN):  acetaminophen     Tablet .. 975 milliGRAM(s) Oral every 6 hours PRN Temp greater or equal to 38C (100.4F), Mild Pain (1 - 3)  HYDROmorphone   Tablet 4 milliGRAM(s) Oral every 4 hours PRN Severe Pain (7 - 10)  HYDROmorphone  Injectable 1 milliGRAM(s) IV Push every 6 hours PRN severe breakthrough pain      Review of Systems:  CONSTITUTIONAL:  No weight loss, fever, chills, weakness or fatigue.  HEENT:  Eyes:  No visual loss, blurred vision, double vision or yellow sclera. Ears, Nose, Throat:  No hearing loss, sneezing, congestion, runny nose or sore throat.  SKIN:  No rash or itching.  CARDIOVASCULAR:  No chest pain, chest pressure or chest discomfort. No palpitations or edema.  RESPIRATORY:  No shortness of breath, cough or sputum.  GASTROINTESTINAL:  No anorexia, nausea, vomiting or diarrhea. No abdominal pain or blood.  GENITOURINARY:  No burning on urination or incontinence   NEUROLOGICAL:  No headache, dizziness, syncope, paralysis, ataxia, numbness or tingling in the extremities. No change in bowel or bladder control.   no vision changes.  L leg numbness   MUSCULOSKELETAL: back pain   HEMATOLOGIC:  No anemia, bleeding or bruising.  LYMPHATICS:  No enlarged nodes. No history of splenectomy.  PSYCHIATRIC:  No history of depression or anxiety.  ENDOCRINOLOGIC:  No reports of sweating, cold or heat intolerance. No polyuria or polydipsia.      Vitals:  Vital Signs Last 24 Hrs  T(C): 36.8 (01 Nov 2024 07:54), Max: 37 (31 Oct 2024 11:00)  T(F): 98.2 (01 Nov 2024 07:54), Max: 98.6 (31 Oct 2024 11:00)  HR: 74 (01 Nov 2024 07:54) (69 - 76)  BP: 110/76 (01 Nov 2024 07:54) (110/76 - 171/111)  BP(mean): --  RR: 18 (01 Nov 2024 07:54) (18 - 18)  SpO2: 96% (01 Nov 2024 07:54) (96% - 100%)    Parameters below as of 01 Nov 2024 07:54  Patient On (Oxygen Delivery Method): room air        General Exam:   General Appearance: Appropriately dressed and in no acute distress       Head: Normocephalic, atraumatic and no dysmorphic features  Ear, Nose, and Throat: Moist mucous membranes  CVS: S1S2+  Resp: No SOB, no wheeze or rhonchi  GI: soft NT/ND  Extremities: No edema or cyanosis  Skin: No bruises or rashes     Neurological Exam:  Mental Status: Awake, alert and oriented x 3.  Able to follow simple and complex verbal commands. Able to name and repeat. fluent speech. No obvious aphasia or dysarthria noted.   Cranial Nerves: PERRL, EOMI, VFFC, sensation V1-V3 intact,  no obvious facial asymmetry, equal elevation of palate, scm/trap 5/5, tongue is midline on protrusion. no obvious papilledema on fundoscopic exam. hearing is grossly intact.   Motor: Normal bulk, tone and strength throughout except pain limited weakness LLE   Sensation: Intact to light touch and pinprick throughout. no right/left confusion. no extinction to tactile on DSS.   Coordination: No dysmetria on FNF   Gait: stood but antalgic     Data/Labs/Imaging which I personally reviewed.     Labs:               < from: CT Lumbar Spine No Cont (10.26.24 @ 19:10) >    ACC: [id number] EXAM:  CT LUMBAR SPINE   ORDERED BY: LUCIA GONSALES     PROCEDURE DATE:  10/26/2024          INTERPRETATION:  EXAM: CT OF THE LUMBAR SPINE WITHOUT CONTRAST    HISTORY: worsening low back pain with left sciatica, history of a dural 1   month ago    TECHNIQUE:  CT of the lumbar spine was performed without administration of   intravenous contrast. Sagittal and coronal reconstructions were   subsequently provided.    COMPARISON: CT abdomen pelvis January 14, 2023    FINDINGS:    Note, mild sacralization of the left L5 benjamin-vertebra    There is a lumbar lordosis. Trace grade 1 retrolisthesis of L4 on L5. The   vertebral bodies have the appropriate height. The intervertebral discs   likewise have the appropriate height. No jumped or perched facets. No   acute osseous fracture.    T12-L1: No large disc bulge. The bilateral neuroforamina are patent. No   significant narrowing of the spinal canal.    L1-L2: No large disc bulge. The bilateral neuroforamina are patent. No   significant narrowing of the spinal canal.    L2-L3: No large disc bulge. The bilateral neuroforamina are patent. No   significant narrowing of the spinal canal.    L3-L4: Small disc bulge. The bilateral neuroforamina are patent. No   significant narrowing of the spinal canal.    L4-L5: Disc bulge with protrusion, eccentric to the left, effacing the   left foraminal/extraforaminal zones. Moderate narrowing of the right   neuroforamen and moderate to severe narrowing of the left neuroforamen.   No significant narrowing of the spinal canal.    L5-S1: Subtle disc bulge. Mild narrowing of the bilateral neuroforamen.   No significant narrowing of the spinal canal.    The paraspinal muscles are unremarkable. Visualized portions of the upper   abdominal viscera appear within normal limits.    IMPRESSION:    1.  No evidence of acute osseous fracture or shekhar dislocation.  2.  Degenerative changes in the lower lumbar spine.  3.  Sacralization of the left L5 hemivertebra. A component of Bertolotti   syndrome could be considered.      --- End of Report ---          ROSELIA STEINER MD; Resident Radiologist  This document has been electronically signed.  LO ROSAS MD; Attending Radiologist  This document has been electronically signed. Oct 27 2024  9:45AM    < end of copied text >      < from: MR Lumbar Spine No Cont (10.27.24 @ 16:59) >  IMPRESSION:  Nonspecific increased signal is noted within the interspinous ligament at   L4-L5 and L5-S1 which may reflect ligamentous sprain, posttraumatic or   degenerative change.    Disc bulging at L4-L5 contributes to moderate left foraminal narrowing.    --- End of Report ---    < end of copied text >      < from: MR Cervical Spine w/wo IV Cont (10.31.24 @ 14:39) >    IMPRESSION:    MRI CERVICAL SPINE:  1.  No evidence of critical narrowing of the spinal canal or abnormal   cord signal.  2.  Degenerative changes of the cervical spine.    MRI THORACIC SPINE:  1.  No evidence of critical narrowing of the spinal canal or abnormal   cord signal.    MRI LUMBAR SPINE:  1.  No evidence of critical narrowing of the spinal canal.  2.  Degenerative changes of the lumbar spine as discussed above.  3.  Redemonstrated faint increased T2/STIR signal about the interspinous   ligaments at L4-S1, mildly improved. This could be postoperative in   nature. A ligamentous sprain or degenerative change could present   similarly.    --- End of Report ---            LO ROSAS MD; Attending Radiologist  This document has been electronically signed. Oct 31 2024  3:10PM    < end of copied text >

## 2024-11-01 NOTE — DISCHARGE NOTE NURSING/CASE MANAGEMENT/SOCIAL WORK - NSDCFUADDAPPT_GEN_ALL_CORE_FT
APPTS ARE READY TO BE MADE: [x ] YES    Best Family or Patient Contact (if needed): Ruth Armstrong (spouse) [phone]    Additional Information about above appointments (if needed): all 1-2 weeks after discharge    1: Orthopedics-spine Dr Cruz  2: Pain Management Dr. Reed  3: Neurology Dr. Chan  4: Primary care    Other comments or requests:

## 2024-11-01 NOTE — DISCHARGE NOTE PROVIDER - NSDCMRMEDTOKEN_GEN_ALL_CORE_FT
cholecalciferol 25 mcg (1000 intl units) oral tablet: 1 tab(s) orally once a day  cyanocobalamin 1000 mcg oral tablet: 1 tab(s) orally once a day  lidocaine 5% topical film: Apply topically to affected area once a day  Lyrica 75 mg oral capsule: 1 cap(s) orally 2 times a day  methocarbamol 750 mg oral tablet: 1 tab(s) orally 2 times a day  naproxen 500 mg oral tablet: 1 tab(s) orally 2 times a day  oxyCODONE 5 mg oral tablet: 1 tab(s) orally 2 times a day as needed for pain  Tylenol 325 mg oral tablet: 2 tab(s) orally as needed for pain  Voltaren 1% topical gel: Apply topically to affected area 2 times a day   cholecalciferol 25 mcg (1000 intl units) oral tablet: 1 tab(s) orally once a day  cyanocobalamin 1000 mcg oral tablet: 1 tab(s) orally once a day  lidocaine 5% topical film: Apply topically to affected area once a day  Lyrica 75 mg oral capsule: 1 cap(s) orally 2 times a day  methocarbamol 750 mg oral tablet: 1 tab(s) orally 2 times a day  naproxen 500 mg oral tablet: 1 tab(s) orally 2 times a day  oxyCODONE 5 mg oral tablet: 1 tab(s) orally 2 times a day as needed for pain  Rolling Walker: Use as directed  Height 170 cm  Weight 67 kg  DELANEY: 99  Diagnoses: bilateral sciatic pain  ICD10: M54.31  Tylenol 325 mg oral tablet: 2 tab(s) orally as needed for pain  Voltaren 1% topical gel: Apply topically to affected area 2 times a day   acetaminophen 325 mg oral tablet: 2 tab(s) orally every 6 hours  bisacodyl 5 mg oral delayed release tablet: 1 tab(s) orally once a day (at bedtime)  cholecalciferol 25 mcg (1000 intl units) oral tablet: 1 tab(s) orally once a day  cyanocobalamin 1000 mcg oral tablet: 1 tab(s) orally once a day  dexAMETHasone 2 mg oral tablet: 1 tab(s) orally every 8 to 12 hours Take 3 tablets by mouth every 8 hours on 11/01/24, then  Take 2 tablets by mouth every 12 hours on 11/02/24 and 11/03/24, then  Take 1 tablet by mouth every 12 hours on 11/04/24 and 11/05/24  HYDROmorphone 4 mg oral tablet: 1 tab(s) orally every 4 hours as needed for Severe Pain (7 - 10) MDD: 6 tabs  lidocaine 5% topical film: Apply topically to affected area once a day  Metoprolol Tartrate 25 mg oral tablet: 0.5 tab(s) orally every 12 hours  Narcan 4 mg/0.1 mL nasal spray: 1 spray(s) intranasally every 2 to 3 minutes as needed for  opioid overdose  Outpatient Physical Therapy: As directed by a physical therapist for diagnosis: bilateral low back pain, bilateral sciatica  pantoprazole 40 mg oral delayed release tablet: 1 tab(s) orally once a day (before a meal)  polyethylene glycol 3350 oral powder for reconstitution: 17 gram(s) orally 2 times a day as needed for  constipation  pregabalin 150 mg oral capsule: 1 cap(s) orally 2 times a day MDD: 2 caps  Rolling Walker: Use as directed  Height 170 cm  Weight 67 kg  DELANEY: 99  Diagnoses: bilateral sciatic pain  ICD10: M54.31  senna leaf extract oral tablet: 2 tab(s) orally once a day (at bedtime)  tiZANidine 4 mg oral tablet: 1 tab(s) orally every 6 hours MDD: 4 tablets  Voltaren 1% topical gel: Apply topically to affected area 2 times a day as needed for  pain

## 2024-11-04 LAB — ANA TITR SER: NEGATIVE — SIGNIFICANT CHANGE UP

## 2025-03-04 PROBLEM — Z00.00 ENCOUNTER FOR PREVENTIVE HEALTH EXAMINATION: Status: ACTIVE | Noted: 2025-03-04

## 2025-03-07 ENCOUNTER — OUTPATIENT (OUTPATIENT)
Dept: OUTPATIENT SERVICES | Facility: HOSPITAL | Age: 50
LOS: 1 days | End: 2025-03-07
Payer: COMMERCIAL

## 2025-03-07 ENCOUNTER — APPOINTMENT (OUTPATIENT)
Dept: CT IMAGING | Facility: CLINIC | Age: 50
End: 2025-03-07
Payer: COMMERCIAL

## 2025-03-07 DIAGNOSIS — Z00.8 ENCOUNTER FOR OTHER GENERAL EXAMINATION: ICD-10-CM

## 2025-03-07 DIAGNOSIS — E78.01 FAMILIAL HYPERCHOLESTEROLEMIA: ICD-10-CM

## 2025-03-07 DIAGNOSIS — R07.0 PAIN IN THROAT: ICD-10-CM

## 2025-03-07 PROCEDURE — 75574 CT ANGIO HRT W/3D IMAGE: CPT

## 2025-03-07 PROCEDURE — 75574 CT ANGIO HRT W/3D IMAGE: CPT | Mod: 26
